# Patient Record
Sex: MALE | Race: WHITE | Employment: OTHER | URBAN - METROPOLITAN AREA
[De-identification: names, ages, dates, MRNs, and addresses within clinical notes are randomized per-mention and may not be internally consistent; named-entity substitution may affect disease eponyms.]

---

## 2017-01-09 PROCEDURE — 88108 CYTOPATH CONCENTRATE TECH: CPT | Performed by: UROLOGY

## 2017-02-22 PROCEDURE — 88305 TISSUE EXAM BY PATHOLOGIST: CPT | Performed by: UROLOGY

## 2017-05-10 PROCEDURE — 88108 CYTOPATH CONCENTRATE TECH: CPT | Performed by: UROLOGY

## 2017-06-09 PROCEDURE — 88108 CYTOPATH CONCENTRATE TECH: CPT | Performed by: UROLOGY

## 2017-07-14 PROCEDURE — 88305 TISSUE EXAM BY PATHOLOGIST: CPT | Performed by: UROLOGY

## 2017-07-14 PROCEDURE — 88108 CYTOPATH CONCENTRATE TECH: CPT | Performed by: UROLOGY

## 2017-09-28 PROBLEM — I77.811 AORTIC ECTASIA, ABDOMINAL (HCC): Status: ACTIVE | Noted: 2017-09-28

## 2019-01-01 ENCOUNTER — ANESTHESIA (OUTPATIENT)
Dept: ENDOSCOPY | Facility: HOSPITAL | Age: 79
End: 2019-01-01
Payer: MEDICARE

## 2019-01-01 ENCOUNTER — ANESTHESIA EVENT (OUTPATIENT)
Dept: ENDOSCOPY | Facility: HOSPITAL | Age: 79
End: 2019-01-01
Payer: MEDICARE

## 2019-01-01 ENCOUNTER — HOSPITAL ENCOUNTER (OUTPATIENT)
Facility: HOSPITAL | Age: 79
Setting detail: HOSPITAL OUTPATIENT SURGERY
Discharge: HOME | End: 2019-01-01
Attending: INTERNAL MEDICINE | Admitting: INTERNAL MEDICINE
Payer: MEDICARE

## 2019-01-01 VITALS
BODY MASS INDEX: 21.26 KG/M2 | DIASTOLIC BLOOD PRESSURE: 69 MMHG | WEIGHT: 157 LBS | SYSTOLIC BLOOD PRESSURE: 117 MMHG | HEART RATE: 73 BPM | HEIGHT: 72 IN | OXYGEN SATURATION: 99 % | RESPIRATION RATE: 17 BRPM

## 2019-01-01 DIAGNOSIS — R13.10 ODYNOPHAGIA: ICD-10-CM

## 2019-01-01 DIAGNOSIS — R07.89 MIDSTERNAL CHEST PAIN: ICD-10-CM

## 2019-01-01 DIAGNOSIS — R63.4 WEIGHT LOSS: ICD-10-CM

## 2019-01-01 DIAGNOSIS — K51.90 ULCERATIVE COLITIS WITHOUT COMPLICATIONS, UNSPECIFIED LOCATION (HCC): ICD-10-CM

## 2019-01-01 PROCEDURE — 0DBH8ZX EXCISION OF CECUM, VIA NATURAL OR ARTIFICIAL OPENING ENDOSCOPIC, DIAGNOSTIC: ICD-10-PCS | Performed by: INTERNAL MEDICINE

## 2019-01-01 PROCEDURE — 0DBN8ZX EXCISION OF SIGMOID COLON, VIA NATURAL OR ARTIFICIAL OPENING ENDOSCOPIC, DIAGNOSTIC: ICD-10-PCS | Performed by: INTERNAL MEDICINE

## 2019-01-01 PROCEDURE — 0DB58ZX EXCISION OF ESOPHAGUS, VIA NATURAL OR ARTIFICIAL OPENING ENDOSCOPIC, DIAGNOSTIC: ICD-10-PCS | Performed by: INTERNAL MEDICINE

## 2019-01-01 PROCEDURE — 0DB68ZX EXCISION OF STOMACH, VIA NATURAL OR ARTIFICIAL OPENING ENDOSCOPIC, DIAGNOSTIC: ICD-10-PCS | Performed by: INTERNAL MEDICINE

## 2019-01-01 PROCEDURE — 0DBL8ZX EXCISION OF TRANSVERSE COLON, VIA NATURAL OR ARTIFICIAL OPENING ENDOSCOPIC, DIAGNOSTIC: ICD-10-PCS | Performed by: INTERNAL MEDICINE

## 2019-01-01 PROCEDURE — 88305 TISSUE EXAM BY PATHOLOGIST: CPT | Performed by: INTERNAL MEDICINE

## 2019-01-01 PROCEDURE — 88312 SPECIAL STAINS GROUP 1: CPT | Performed by: INTERNAL MEDICINE

## 2019-01-01 PROCEDURE — 0DBK8ZX EXCISION OF ASCENDING COLON, VIA NATURAL OR ARTIFICIAL OPENING ENDOSCOPIC, DIAGNOSTIC: ICD-10-PCS | Performed by: INTERNAL MEDICINE

## 2019-01-01 PROCEDURE — 0DB98ZX EXCISION OF DUODENUM, VIA NATURAL OR ARTIFICIAL OPENING ENDOSCOPIC, DIAGNOSTIC: ICD-10-PCS | Performed by: INTERNAL MEDICINE

## 2019-01-01 RX ORDER — SODIUM CHLORIDE, SODIUM LACTATE, POTASSIUM CHLORIDE, CALCIUM CHLORIDE 600; 310; 30; 20 MG/100ML; MG/100ML; MG/100ML; MG/100ML
INJECTION, SOLUTION INTRAVENOUS CONTINUOUS
Status: CANCELLED | OUTPATIENT
Start: 2019-01-01

## 2019-01-01 RX ORDER — MIDAZOLAM HYDROCHLORIDE 1 MG/ML
1 INJECTION INTRAMUSCULAR; INTRAVENOUS EVERY 5 MIN PRN
Status: ACTIVE | OUTPATIENT
Start: 2019-01-01

## 2019-01-01 RX ORDER — NALOXONE HYDROCHLORIDE 0.4 MG/ML
80 INJECTION, SOLUTION INTRAMUSCULAR; INTRAVENOUS; SUBCUTANEOUS AS NEEDED
Status: DISCONTINUED | OUTPATIENT
Start: 2019-01-01 | End: 2019-01-01

## 2019-01-01 RX ORDER — SODIUM CHLORIDE, SODIUM LACTATE, POTASSIUM CHLORIDE, CALCIUM CHLORIDE 600; 310; 30; 20 MG/100ML; MG/100ML; MG/100ML; MG/100ML
INJECTION, SOLUTION INTRAVENOUS CONTINUOUS
Status: DISCONTINUED | OUTPATIENT
Start: 2019-01-01 | End: 2019-01-01

## 2019-01-01 RX ORDER — SODIUM CHLORIDE 0.9 % (FLUSH) 0.9 %
10 SYRINGE (ML) INJECTION AS NEEDED
Status: CANCELLED | OUTPATIENT
Start: 2019-01-01

## 2019-01-01 RX ADMIN — SODIUM CHLORIDE, SODIUM LACTATE, POTASSIUM CHLORIDE, CALCIUM CHLORIDE: 600; 310; 30; 20 INJECTION, SOLUTION INTRAVENOUS at 14:18:00

## 2019-02-18 PROCEDURE — 88108 CYTOPATH CONCENTRATE TECH: CPT | Performed by: UROLOGY

## 2019-09-20 PROBLEM — I48.20 CHRONIC ATRIAL FIBRILLATION (HCC): Status: ACTIVE | Noted: 2019-01-01

## 2019-09-20 PROBLEM — R63.4 WEIGHT LOSS: Status: ACTIVE | Noted: 2019-01-01

## 2019-09-20 PROBLEM — Z51.81 MONITORING FOR LONG-TERM ANTICOAGULANT USE: Status: ACTIVE | Noted: 2019-01-01

## 2019-09-20 PROBLEM — Z79.01 MONITORING FOR LONG-TERM ANTICOAGULANT USE: Status: ACTIVE | Noted: 2019-01-01

## 2019-09-24 PROBLEM — R13.10 ODYNOPHAGIA: Status: ACTIVE | Noted: 2019-01-01

## 2019-09-24 PROBLEM — R07.89 MIDSTERNAL CHEST PAIN: Status: ACTIVE | Noted: 2019-01-01

## 2019-10-01 NOTE — ANESTHESIA PREPROCEDURE EVALUATION
Anesthesia PreOp Note    HPI:     Curtis Piedra is a 70year old male who presents for preoperative consultation requested by: Deonte Kimball MD    Date of Surgery: 10/1/2019    Procedure(s):  COLONOSCOPY  ESOPHAGOGASTRODUODENOSCOPY (EGD)  Indication: Isaias Thomps[place] Skin cancer - BCC   • COPD     moderate, FEV 1 2.5L (66%) - patient denies as of 3/11/14   • Gross hematuria    • High cholesterol    • Hypercholesterolemia     well controlled with med   • HYPERLIPIDEMIA    • Kidney disease     kidney stones   • OTHER DIS SCC   • TONSILLECTOMY           Medications Prior to Admission:  ATORVASTATIN 20 MG Oral Tab TAKE 1  TABLET ONCE DAILY Disp: 90 tablet Rfl: 2 9/24/2019   CARTIA  MG Oral Capsule SR 24 Hr TAKE ONE CAPSULE BY MOUTH ONE TIME DAILY  Disp: 90 capsule Rfl: Attends Buddhism service: Not on file        Active member of club or organization: Not on file        Attends meetings of clubs or organizations: Not on file        Relationship status: Not on file      Intimate partner violence:        Fear of current o of the anesthetic plan, benefits, risks including possible dental damage if relevant, major complications, and any alternative forms of anesthetic management. All of the patient's questions were answered to the best of my ability.  The patient desires the

## 2019-10-01 NOTE — H&P
The H&P dated 9/24/19 was reviewed by Shakira Evans MD today, the patient was examined and no significant changes have occurred in the patient's condition since the H&P was performed.   I discussed with the patient and/or legal representative the potential

## 2019-10-01 NOTE — DISCHARGE SUMMARY
ENDOSCOPY DISCHARGE INSTRUCTIONS    Procedure Performed:   Gastroscopy and Colonoscopy    Endoscopist: No name on file.   FINDINGS:   Normal Esophagus  Nodular appearance of the stomach which was biopsied  Benign outpouching of the small bowel  Numerous col

## 2019-10-01 NOTE — ANESTHESIA POSTPROCEDURE EVALUATION
Patient: Montse Klein    Procedure Summary     Date:  10/01/19 Room / Location:  [facility] ENDOSCOPY 04 / [facility] ENDOSCOPY    Anesthesia Start:  0621 Anesthesia Stop:  6500    Procedures:       COLONOSCOPY (N/A )      ESOPHAGOGASTRODUODENOSCOPY (EGD) (N/A ) Diagn

## 2019-10-01 NOTE — OPERATIVE REPORT
EGD/Colonoscopy Operative Report    Stevie Krystain Patient Status:  Hospital Outpatient Surgery    1940 MRN [id number]   Location [facility] ENDOSCOPY LAB SUITES Attending Sadia Gallagher MD   H gastric antrum. The endoscope was then retroflexed to examine the angulus, GE junction, cardia, body and fundus,  then withdrawn to examine the esophagus. The endoscope was then removed from the patient.  The patient tolerated the procedure well with no im with cold forceps  - 1 sigmoid colon polyp measuring 5 mm s/p removal with cold snare.    - Multiple additional small left sided colon polyps which had pitted pattern on NBI consistent with pseudopolyps which were not removed.   - Sigmoid diverticulosis  -

## 2019-10-03 NOTE — PROGRESS NOTES
Patient with all polyps showing tubular adenomas some of which had appeared hyperplastic during endoscopy. He will need to be brought back in the next 3 months for repeat Colonoscopy with at least 45 minute slot for removal of the rest of the polyps.   He

## 2020-01-01 ENCOUNTER — APPOINTMENT (OUTPATIENT)
Dept: GENERAL RADIOLOGY | Facility: HOSPITAL | Age: 80
DRG: 374 | End: 2020-01-01
Attending: EMERGENCY MEDICINE
Payer: MEDICARE

## 2020-01-01 ENCOUNTER — HOSPITAL ENCOUNTER (INPATIENT)
Facility: HOSPITAL | Age: 80
LOS: 3 days | Discharge: HOME | DRG: 374 | End: 2020-01-01
Attending: EMERGENCY MEDICINE | Admitting: HOSPITALIST
Payer: MEDICARE

## 2020-01-01 ENCOUNTER — APPOINTMENT (OUTPATIENT)
Dept: CT IMAGING | Facility: HOSPITAL | Age: 80
DRG: 871 | End: 2020-01-01
Attending: EMERGENCY MEDICINE
Payer: MEDICARE

## 2020-01-01 ENCOUNTER — APPOINTMENT (OUTPATIENT)
Dept: GENERAL RADIOLOGY | Facility: HOSPITAL | Age: 80
DRG: 871 | End: 2020-01-01
Attending: INTERNAL MEDICINE
Payer: MEDICARE

## 2020-01-01 ENCOUNTER — ANESTHESIA (OUTPATIENT)
Dept: ENDOSCOPY | Facility: HOSPITAL | Age: 80
End: 2020-01-01
Payer: MEDICARE

## 2020-01-01 ENCOUNTER — APPOINTMENT (OUTPATIENT)
Dept: GENERAL RADIOLOGY | Facility: HOSPITAL | Age: 80
DRG: 871 | End: 2020-01-01
Attending: SURGERY
Payer: MEDICARE

## 2020-01-01 ENCOUNTER — ANESTHESIA EVENT (OUTPATIENT)
Dept: ENDOSCOPY | Facility: HOSPITAL | Age: 80
End: 2020-01-01
Payer: MEDICARE

## 2020-01-01 ENCOUNTER — APPOINTMENT (OUTPATIENT)
Dept: GENERAL RADIOLOGY | Facility: HOSPITAL | Age: 80
DRG: 871 | End: 2020-01-01
Attending: EMERGENCY MEDICINE
Payer: MEDICARE

## 2020-01-01 ENCOUNTER — LAB ENCOUNTER (OUTPATIENT)
Dept: LAB | Age: 80
End: 2020-01-01
Payer: MEDICARE

## 2020-01-01 ENCOUNTER — APPOINTMENT (OUTPATIENT)
Dept: NUCLEAR MEDICINE | Facility: HOSPITAL | Age: 80
DRG: 871 | End: 2020-01-01
Attending: EMERGENCY MEDICINE
Payer: MEDICARE

## 2020-01-01 ENCOUNTER — HOSPITAL ENCOUNTER (INPATIENT)
Facility: HOSPITAL | Age: 80
LOS: 1 days | DRG: 871 | End: 2020-01-01
Attending: EMERGENCY MEDICINE | Admitting: HOSPITALIST
Payer: MEDICARE

## 2020-01-01 ENCOUNTER — HOSPITAL ENCOUNTER (OUTPATIENT)
Facility: HOSPITAL | Age: 80
Setting detail: HOSPITAL OUTPATIENT SURGERY
Discharge: HOME | End: 2020-01-01
Attending: INTERNAL MEDICINE | Admitting: INTERNAL MEDICINE
Payer: MEDICARE

## 2020-01-01 ENCOUNTER — APPOINTMENT (OUTPATIENT)
Dept: PICC SERVICES | Facility: HOSPITAL | Age: 80
DRG: 871 | End: 2020-01-01
Attending: INTERNAL MEDICINE
Payer: MEDICARE

## 2020-01-01 ENCOUNTER — HOSPITAL ENCOUNTER (EMERGENCY)
Facility: HOSPITAL | Age: 80
Discharge: HOME | End: 2020-01-01
Attending: EMERGENCY MEDICINE
Payer: MEDICARE

## 2020-01-01 VITALS
RESPIRATION RATE: 17 BRPM | SYSTOLIC BLOOD PRESSURE: 112 MMHG | HEART RATE: 81 BPM | TEMPERATURE: 97 F | OXYGEN SATURATION: 99 % | DIASTOLIC BLOOD PRESSURE: 79 MMHG

## 2020-01-01 VITALS
HEIGHT: 73 IN | HEART RATE: 51 BPM | SYSTOLIC BLOOD PRESSURE: 33 MMHG | WEIGHT: 120 LBS | TEMPERATURE: 96 F | OXYGEN SATURATION: 72 % | BODY MASS INDEX: 15.9 KG/M2 | DIASTOLIC BLOOD PRESSURE: 20 MMHG

## 2020-01-01 VITALS
OXYGEN SATURATION: 93 % | DIASTOLIC BLOOD PRESSURE: 87 MMHG | TEMPERATURE: 97 F | BODY MASS INDEX: 20.32 KG/M2 | HEIGHT: 72 IN | HEART RATE: 73 BPM | RESPIRATION RATE: 14 BRPM | WEIGHT: 150 LBS | SYSTOLIC BLOOD PRESSURE: 141 MMHG

## 2020-01-01 VITALS
HEART RATE: 108 BPM | WEIGHT: 134.38 LBS | TEMPERATURE: 98 F | BODY MASS INDEX: 18 KG/M2 | DIASTOLIC BLOOD PRESSURE: 66 MMHG | RESPIRATION RATE: 19 BRPM | OXYGEN SATURATION: 100 % | SYSTOLIC BLOOD PRESSURE: 106 MMHG

## 2020-01-01 DIAGNOSIS — R53.1 WEAKNESS: ICD-10-CM

## 2020-01-01 DIAGNOSIS — R77.8 ELEVATED TROPONIN: ICD-10-CM

## 2020-01-01 DIAGNOSIS — K52.9 ILEITIS: ICD-10-CM

## 2020-01-01 DIAGNOSIS — I48.91 ATRIAL FIBRILLATION WITH RAPID VENTRICULAR RESPONSE (HCC): Primary | ICD-10-CM

## 2020-01-01 DIAGNOSIS — I48.91 ATRIAL FIBRILLATION WITH RAPID VENTRICULAR RESPONSE (HCC): ICD-10-CM

## 2020-01-01 DIAGNOSIS — Z00.00 ROUTINE GENERAL MEDICAL EXAMINATION AT A HEALTH CARE FACILITY: Primary | ICD-10-CM

## 2020-01-01 DIAGNOSIS — K86.89 DILATED PANCREATIC DUCT: ICD-10-CM

## 2020-01-01 DIAGNOSIS — R79.1 SUPRATHERAPEUTIC INR: ICD-10-CM

## 2020-01-01 DIAGNOSIS — N17.9 AKI (ACUTE KIDNEY INJURY) (HCC): ICD-10-CM

## 2020-01-01 DIAGNOSIS — K92.2 GASTROINTESTINAL HEMORRHAGE, UNSPECIFIED GASTROINTESTINAL HEMORRHAGE TYPE: Primary | ICD-10-CM

## 2020-01-01 DIAGNOSIS — K59.00 CONSTIPATION, UNSPECIFIED CONSTIPATION TYPE: Primary | ICD-10-CM

## 2020-01-01 DIAGNOSIS — C16.9 ADENOCARCINOMA OF STOMACH (HCC): Primary | ICD-10-CM

## 2020-01-01 DIAGNOSIS — R63.4 UNINTENTIONAL WEIGHT LOSS: ICD-10-CM

## 2020-01-01 DIAGNOSIS — K56.600 PARTIAL INTESTINAL OBSTRUCTION, UNSPECIFIED CAUSE (HCC): ICD-10-CM

## 2020-01-01 LAB
ADEQUACY OF SPECIMEN: ADEQUATE
ANION GAP SERPL CALC-SCNC: 12 MMOL/L (ref 0–18)
ANION GAP SERPL CALC-SCNC: 4 MMOL/L (ref 0–18)
ANION GAP SERPL CALC-SCNC: 6 MMOL/L (ref 0–18)
ANION GAP SERPL CALC-SCNC: 7 MMOL/L (ref 0–18)
ANTIBODY SCREEN: NEGATIVE
APTT PPP: 30.9 SECONDS (ref 23.2–35.3)
BASOPHILS # BLD: 0 X10(3) UL (ref 0–0.2)
BASOPHILS NFR BLD: 0 %
BILIRUB UR QL: NEGATIVE
BLOOD TYPE BARCODE: 6200
BLOOD TYPE BARCODE: 8400
BLOOD TYPE BARCODE: 8400
BUN BLD-MCNC: 10 MG/DL (ref 7–18)
BUN BLD-MCNC: 13 MG/DL (ref 7–18)
BUN BLD-MCNC: 23 MG/DL (ref 7–18)
BUN BLD-MCNC: 44 MG/DL (ref 7–18)
BUN/CREAT SERPL: 15.2 (ref 10–20)
BUN/CREAT SERPL: 17.3 (ref 10–20)
BUN/CREAT SERPL: 28.4 (ref 10–20)
BUN/CREAT SERPL: 40.7 (ref 10–20)
C DIFF TOX B STL QL: NEGATIVE
CALCIUM BLD-MCNC: 7.5 MG/DL (ref 8.5–10.1)
CALCIUM BLD-MCNC: 7.6 MG/DL (ref 8.5–10.1)
CALCIUM BLD-MCNC: 7.8 MG/DL (ref 8.5–10.1)
CALCIUM BLD-MCNC: 8 MG/DL (ref 8.5–10.1)
CHLORIDE SERPL-SCNC: 108 MMOL/L (ref 98–112)
CHLORIDE SERPL-SCNC: 110 MMOL/L (ref 98–112)
CHLORIDE SERPL-SCNC: 111 MMOL/L (ref 98–112)
CHLORIDE SERPL-SCNC: 116 MMOL/L (ref 98–112)
CLARITY UR: CLEAR
CO2 SERPL-SCNC: 21 MMOL/L (ref 21–32)
CO2 SERPL-SCNC: 25 MMOL/L (ref 21–32)
CO2 SERPL-SCNC: 26 MMOL/L (ref 21–32)
CO2 SERPL-SCNC: 26 MMOL/L (ref 21–32)
COLOR UR: YELLOW
CREAT BLD-MCNC: 0.66 MG/DL (ref 0.7–1.3)
CREAT BLD-MCNC: 0.75 MG/DL (ref 0.7–1.3)
CREAT BLD-MCNC: 0.81 MG/DL (ref 0.7–1.3)
CREAT BLD-MCNC: 1.08 MG/DL (ref 0.7–1.3)
D DIMER PPP FEU-MCNC: <0.27 UG/ML FEU (ref ?–0.77)
DEPRECATED RDW RBC AUTO: 49.8 FL (ref 35.1–46.3)
DEPRECATED RDW RBC AUTO: 50.6 FL (ref 35.1–46.3)
DEPRECATED RDW RBC AUTO: 50.9 FL (ref 35.1–46.3)
DEPRECATED RDW RBC AUTO: 51.3 FL (ref 35.1–46.3)
DEPRECATED RDW RBC AUTO: 52.4 FL (ref 35.1–46.3)
DEPRECATED RDW RBC AUTO: 52.5 FL (ref 35.1–46.3)
DOHLE BOD BLD QL SMEAR: PRESENT
EOSINOPHIL # BLD: 0 X10(3) UL (ref 0–0.7)
EOSINOPHIL # BLD: 0.34 X10(3) UL (ref 0–0.7)
EOSINOPHIL NFR BLD: 0 %
EOSINOPHIL NFR BLD: 1 %
ERYTHROCYTE [DISTWIDTH] IN BLOOD BY AUTOMATED COUNT: 14.6 % (ref 11–15)
ERYTHROCYTE [DISTWIDTH] IN BLOOD BY AUTOMATED COUNT: 15.3 % (ref 11–15)
ERYTHROCYTE [DISTWIDTH] IN BLOOD BY AUTOMATED COUNT: 15.9 % (ref 11–15)
ERYTHROCYTE [DISTWIDTH] IN BLOOD BY AUTOMATED COUNT: 16.1 % (ref 11–15)
ERYTHROCYTE [DISTWIDTH] IN BLOOD BY AUTOMATED COUNT: 17.3 % (ref 11–15)
ERYTHROCYTE [DISTWIDTH] IN BLOOD BY AUTOMATED COUNT: 17.8 % (ref 11–15)
FIBRINOGEN PPP-MCNC: 174 MG/DL (ref 176–491)
GLUCOSE BLD-MCNC: 148 MG/DL (ref 70–99)
GLUCOSE BLD-MCNC: 75 MG/DL (ref 70–99)
GLUCOSE BLD-MCNC: 81 MG/DL (ref 70–99)
GLUCOSE BLD-MCNC: 90 MG/DL (ref 70–99)
GLUCOSE UR-MCNC: NEGATIVE MG/DL
HAV IGM SER QL: 2 MG/DL (ref 1.6–2.6)
HCT VFR BLD AUTO: 12.9 % (ref 39–53)
HCT VFR BLD AUTO: 18.6 % (ref 39–53)
HCT VFR BLD AUTO: 21.8 % (ref 39–53)
HCT VFR BLD AUTO: 22.4 % (ref 39–53)
HCT VFR BLD AUTO: 22.8 % (ref 39–53)
HCT VFR BLD AUTO: 23.7 % (ref 39–53)
HCT VFR BLD AUTO: 24.5 % (ref 39–53)
HCT VFR BLD AUTO: 24.8 % (ref 39–53)
HGB BLD-MCNC: 4.3 G/DL (ref 13–17.5)
HGB BLD-MCNC: 6.3 G/DL (ref 13–17.5)
HGB BLD-MCNC: 7.3 G/DL (ref 13–17.5)
HGB BLD-MCNC: 7.7 G/DL (ref 13–17.5)
HGB BLD-MCNC: 7.8 G/DL (ref 13–17.5)
HGB BLD-MCNC: 7.9 G/DL (ref 13–17.5)
HGB BLD-MCNC: 8.2 G/DL (ref 13–17.5)
HGB BLD-MCNC: 8.2 G/DL (ref 13–17.5)
HGB UR QL STRIP.AUTO: NEGATIVE
INR BLD: 1.27 (ref 0.9–1.2)
INR BLD: 1.4 (ref 0.9–1.2)
INR BLD: 1.45 (ref 0.9–1.2)
INR BLD: 15.23 (ref 0.9–1.2)
INR BLD: 2.2 (ref 0.9–1.2)
KETONES UR-MCNC: NEGATIVE MG/DL
LEUKOCYTE ESTERASE UR QL STRIP.AUTO: NEGATIVE
LYMPHOCYTES NFR BLD: 0.92 X10(3) UL (ref 1–4)
LYMPHOCYTES NFR BLD: 1.4 X10(3) UL (ref 1–4)
LYMPHOCYTES NFR BLD: 1.63 X10(3) UL (ref 1–4)
LYMPHOCYTES NFR BLD: 1.87 X10(3) UL (ref 1–4)
LYMPHOCYTES NFR BLD: 10 %
LYMPHOCYTES NFR BLD: 2.13 X10(3) UL (ref 1–4)
LYMPHOCYTES NFR BLD: 2.72 X10(3) UL (ref 1–4)
LYMPHOCYTES NFR BLD: 4 %
LYMPHOCYTES NFR BLD: 7 %
LYMPHOCYTES NFR BLD: 8 %
MCH RBC QN AUTO: 30.9 PG (ref 26–34)
MCH RBC QN AUTO: 31.1 PG (ref 26–34)
MCH RBC QN AUTO: 31.2 PG (ref 26–34)
MCH RBC QN AUTO: 31.2 PG (ref 26–34)
MCH RBC QN AUTO: 31.3 PG (ref 26–34)
MCH RBC QN AUTO: 32.3 PG (ref 26–34)
MCHC RBC AUTO-ENTMCNC: 33.1 G/DL (ref 31–37)
MCHC RBC AUTO-ENTMCNC: 33.3 G/DL (ref 31–37)
MCHC RBC AUTO-ENTMCNC: 33.3 G/DL (ref 31–37)
MCHC RBC AUTO-ENTMCNC: 33.5 G/DL (ref 31–37)
MCHC RBC AUTO-ENTMCNC: 33.5 G/DL (ref 31–37)
MCHC RBC AUTO-ENTMCNC: 34.4 G/DL (ref 31–37)
MCV RBC AUTO: 90.7 FL (ref 80–100)
MCV RBC AUTO: 93.2 FL (ref 80–100)
MCV RBC AUTO: 93.3 FL (ref 80–100)
MCV RBC AUTO: 93.6 FL (ref 80–100)
MCV RBC AUTO: 93.6 FL (ref 80–100)
MCV RBC AUTO: 97 FL (ref 80–100)
METAMYELOCYTES # BLD: 0.2 X10(3) UL
METAMYELOCYTES # BLD: 0.21 X10(3) UL
METAMYELOCYTES # BLD: 0.68 X10(3) UL
METAMYELOCYTES # BLD: 0.82 X10(3) UL
METAMYELOCYTES # BLD: 0.92 X10(3) UL
METAMYELOCYTES # BLD: 0.94 X10(3) UL
METAMYELOCYTES NFR BLD: 1 %
METAMYELOCYTES NFR BLD: 1 %
METAMYELOCYTES NFR BLD: 2 %
METAMYELOCYTES NFR BLD: 4 %
MONOCYTES # BLD: 0.69 X10(3) UL (ref 0.1–1)
MONOCYTES # BLD: 0.85 X10(3) UL (ref 0.1–1)
MONOCYTES # BLD: 1.7 X10(3) UL (ref 0.1–1)
MONOCYTES # BLD: 1.8 X10(3) UL (ref 0.1–1)
MONOCYTES # BLD: 2.24 X10(3) UL (ref 0.1–1)
MONOCYTES # BLD: 2.34 X10(3) UL (ref 0.1–1)
MONOCYTES NFR BLD: 10 %
MONOCYTES NFR BLD: 11 %
MONOCYTES NFR BLD: 3 %
MONOCYTES NFR BLD: 4 %
MONOCYTES NFR BLD: 5 %
MONOCYTES NFR BLD: 9 %
MYELOCYTES # BLD: 0.41 X10(3) UL
MYELOCYTES # BLD: 0.6 X10(3) UL
MYELOCYTES # BLD: 0.69 X10(3) UL
MYELOCYTES # BLD: 0.7 X10(3) UL
MYELOCYTES # BLD: 1.02 X10(3) UL
MYELOCYTES # BLD: 1.07 X10(3) UL
MYELOCYTES NFR BLD: 2 %
MYELOCYTES NFR BLD: 3 %
MYELOCYTES NFR BLD: 5 %
NEUTROPHILS # BLD AUTO: 12.66 X10 (3) UL (ref 1.5–7.7)
NEUTROPHILS # BLD AUTO: 12.78 X10 (3) UL (ref 1.5–7.7)
NEUTROPHILS # BLD AUTO: 13.8 X10 (3) UL (ref 1.5–7.7)
NEUTROPHILS # BLD AUTO: 13.84 X10 (3) UL (ref 1.5–7.7)
NEUTROPHILS # BLD AUTO: 14.2 X10 (3) UL (ref 1.5–7.7)
NEUTROPHILS # BLD AUTO: 24.33 X10 (3) UL (ref 1.5–7.7)
NEUTROPHILS NFR BLD: 51 %
NEUTROPHILS NFR BLD: 61 %
NEUTROPHILS NFR BLD: 69 %
NEUTROPHILS NFR BLD: 71 %
NEUTROPHILS NFR BLD: 74 %
NEUTROPHILS NFR BLD: 74 %
NEUTS BAND NFR BLD: 12 %
NEUTS BAND NFR BLD: 18 %
NEUTS BAND NFR BLD: 24 %
NEUTS BAND NFR BLD: 6 %
NEUTS BAND NFR BLD: 7 %
NEUTS BAND NFR BLD: 8 %
NEUTS HYPERSEG # BLD: 15.3 X10(3) UL (ref 1.5–7.7)
NEUTS HYPERSEG # BLD: 15.8 X10(3) UL (ref 1.5–7.7)
NEUTS HYPERSEG # BLD: 16.83 X10(3) UL (ref 1.5–7.7)
NEUTS HYPERSEG # BLD: 17.55 X10(3) UL (ref 1.5–7.7)
NEUTS HYPERSEG # BLD: 19.87 X10(3) UL (ref 1.5–7.7)
NEUTS HYPERSEG # BLD: 27.54 X10(3) UL (ref 1.5–7.7)
NITRITE UR QL STRIP.AUTO: NEGATIVE
NRBC BLD MANUAL-RTO: 1 %
NRBC BLD MANUAL-RTO: 12 %
NRBC BLD MANUAL-RTO: 2 %
NRBC BLD MANUAL-RTO: 3 %
NRBC BLD MANUAL-RTO: 4 %
NRBC BLD MANUAL-RTO: 8 %
OSMOLALITY SERPL CALC.SUM OF ELEC: 288 MOSM/KG (ref 275–295)
OSMOLALITY SERPL CALC.SUM OF ELEC: 295 MOSM/KG (ref 275–295)
OSMOLALITY SERPL CALC.SUM OF ELEC: 305 MOSM/KG (ref 275–295)
OSMOLALITY SERPL CALC.SUM OF ELEC: 310 MOSM/KG (ref 275–295)
PERCENT OF CELLS/CIRCUMFEREN: 0
PH UR: 5 [PH] (ref 5–8)
PLATELET # BLD AUTO: 102 10(3)UL (ref 150–450)
PLATELET # BLD AUTO: 103 10(3)UL (ref 150–450)
PLATELET # BLD AUTO: 120 10(3)UL (ref 150–450)
PLATELET # BLD AUTO: 121 10(3)UL (ref 150–450)
PLATELET # BLD AUTO: 125 10(3)UL (ref 150–450)
PLATELET # BLD AUTO: 130 10(3)UL (ref 150–450)
PLATELET # BLD AUTO: 134 10(3)UL (ref 150–450)
PLATELET MORPHOLOGY: NORMAL
POTASSIUM SERPL-SCNC: 3.1 MMOL/L (ref 3.5–5.1)
POTASSIUM SERPL-SCNC: 3.3 MMOL/L (ref 3.5–5.1)
POTASSIUM SERPL-SCNC: 3.3 MMOL/L (ref 3.5–5.1)
POTASSIUM SERPL-SCNC: 3.4 MMOL/L (ref 3.5–5.1)
POTASSIUM SERPL-SCNC: 3.5 MMOL/L (ref 3.5–5.1)
POTASSIUM SERPL-SCNC: 3.6 MMOL/L (ref 3.5–5.1)
PROCALCITONIN SERPL-MCNC: 0.14 NG/ML (ref ?–0.16)
PROMYELOCYTES # BLD: 0.2 X10(3) UL
PROMYELOCYTES # BLD: 0.21 X10(3) UL
PROMYELOCYTES NFR BLD: 1 %
PROMYELOCYTES NFR BLD: 1 %
PROT UR-MCNC: NEGATIVE MG/DL
PROTHROMBIN TIME: 119 SECONDS (ref 11.8–14.5)
PROTHROMBIN TIME: 15.8 SECONDS (ref 11.8–14.5)
PROTHROMBIN TIME: 17.1 SECONDS (ref 11.8–14.5)
PROTHROMBIN TIME: 17.6 SECONDS (ref 11.8–14.5)
PROTHROMBIN TIME: 24.7 SECONDS (ref 11.8–14.5)
RBC # BLD AUTO: 1.33 X10(6)UL (ref 3.8–5.8)
RBC # BLD AUTO: 2.33 X10(6)UL (ref 3.8–5.8)
RBC # BLD AUTO: 2.47 X10(6)UL (ref 3.8–5.8)
RBC # BLD AUTO: 2.54 X10(6)UL (ref 3.8–5.8)
RBC # BLD AUTO: 2.63 X10(6)UL (ref 3.8–5.8)
RBC # BLD AUTO: 2.65 X10(6)UL (ref 3.8–5.8)
RH BLOOD TYPE: POSITIVE
SODIUM SERPL-SCNC: 140 MMOL/L (ref 136–145)
SODIUM SERPL-SCNC: 143 MMOL/L (ref 136–145)
SODIUM SERPL-SCNC: 143 MMOL/L (ref 136–145)
SODIUM SERPL-SCNC: 146 MMOL/L (ref 136–145)
SP GR UR STRIP: 1.01 (ref 1–1.03)
TOTAL CELLS COUNTED: 100
TOXIC GRANULES BLD QL SMEAR: PRESENT
UROBILINOGEN UR STRIP-ACNC: <2
WBC # BLD AUTO: 20 X10(3) UL (ref 4–11)
WBC # BLD AUTO: 20.4 X10(3) UL (ref 4–11)
WBC # BLD AUTO: 21.3 X10(3) UL (ref 4–11)
WBC # BLD AUTO: 23.1 X10(3) UL (ref 4–11)
WBC # BLD AUTO: 23.4 X10(3) UL (ref 4–11)
WBC # BLD AUTO: 34 X10(3) UL (ref 4–11)

## 2020-01-01 PROCEDURE — 85610 PROTHROMBIN TIME: CPT | Performed by: NURSE PRACTITIONER

## 2020-01-01 PROCEDURE — 88108 CYTOPATH CONCENTRATE TECH: CPT | Performed by: SURGERY

## 2020-01-01 PROCEDURE — 84132 ASSAY OF SERUM POTASSIUM: CPT | Performed by: NURSE PRACTITIONER

## 2020-01-01 PROCEDURE — 93010 ELECTROCARDIOGRAM REPORT: CPT | Performed by: EMERGENCY MEDICINE

## 2020-01-01 PROCEDURE — C9113 INJ PANTOPRAZOLE SODIUM, VIA: HCPCS | Performed by: NURSE PRACTITIONER

## 2020-01-01 PROCEDURE — 85027 COMPLETE CBC AUTOMATED: CPT | Performed by: EMERGENCY MEDICINE

## 2020-01-01 PROCEDURE — 71045 X-RAY EXAM CHEST 1 VIEW: CPT | Performed by: EMERGENCY MEDICINE

## 2020-01-01 PROCEDURE — 80048 BASIC METABOLIC PNL TOTAL CA: CPT | Performed by: NURSE PRACTITIONER

## 2020-01-01 PROCEDURE — 97165 OT EVAL LOW COMPLEX 30 MIN: CPT

## 2020-01-01 PROCEDURE — 71045 X-RAY EXAM CHEST 1 VIEW: CPT | Performed by: SURGERY

## 2020-01-01 PROCEDURE — 85025 COMPLETE CBC W/AUTO DIFF WBC: CPT | Performed by: NURSE PRACTITIONER

## 2020-01-01 PROCEDURE — 81003 URINALYSIS AUTO W/O SCOPE: CPT | Performed by: EMERGENCY MEDICINE

## 2020-01-01 PROCEDURE — 88312 SPECIAL STAINS GROUP 1: CPT | Performed by: INTERNAL MEDICINE

## 2020-01-01 PROCEDURE — 86140 C-REACTIVE PROTEIN: CPT | Performed by: EMERGENCY MEDICINE

## 2020-01-01 PROCEDURE — 85007 BL SMEAR W/DIFF WBC COUNT: CPT | Performed by: EMERGENCY MEDICINE

## 2020-01-01 PROCEDURE — 30233K1 TRANSFUSION OF NONAUTOLOGOUS FROZEN PLASMA INTO PERIPHERAL VEIN, PERCUTANEOUS APPROACH: ICD-10-PCS | Performed by: EMERGENCY MEDICINE

## 2020-01-01 PROCEDURE — 36415 COLL VENOUS BLD VENIPUNCTURE: CPT

## 2020-01-01 PROCEDURE — 97116 GAIT TRAINING THERAPY: CPT

## 2020-01-01 PROCEDURE — 97530 THERAPEUTIC ACTIVITIES: CPT

## 2020-01-01 PROCEDURE — 80076 HEPATIC FUNCTION PANEL: CPT | Performed by: EMERGENCY MEDICINE

## 2020-01-01 PROCEDURE — 85014 HEMATOCRIT: CPT | Performed by: NURSE PRACTITIONER

## 2020-01-01 PROCEDURE — 85060 BLOOD SMEAR INTERPRETATION: CPT | Performed by: EMERGENCY MEDICINE

## 2020-01-01 PROCEDURE — BD42ZZZ ULTRASONOGRAPHY OF STOMACH: ICD-10-PCS | Performed by: INTERNAL MEDICINE

## 2020-01-01 PROCEDURE — 80048 BASIC METABOLIC PNL TOTAL CA: CPT | Performed by: EMERGENCY MEDICINE

## 2020-01-01 PROCEDURE — 83605 ASSAY OF LACTIC ACID: CPT | Performed by: INTERNAL MEDICINE

## 2020-01-01 PROCEDURE — 88360 TUMOR IMMUNOHISTOCHEM/MANUAL: CPT

## 2020-01-01 PROCEDURE — 96366 THER/PROPH/DIAG IV INF ADDON: CPT

## 2020-01-01 PROCEDURE — 99285 EMERGENCY DEPT VISIT HI MDM: CPT

## 2020-01-01 PROCEDURE — 74176 CT ABD & PELVIS W/O CONTRAST: CPT | Performed by: EMERGENCY MEDICINE

## 2020-01-01 PROCEDURE — 36430 TRANSFUSION BLD/BLD COMPNT: CPT

## 2020-01-01 PROCEDURE — 84443 ASSAY THYROID STIM HORMONE: CPT | Performed by: EMERGENCY MEDICINE

## 2020-01-01 PROCEDURE — 0DB68ZX EXCISION OF STOMACH, VIA NATURAL OR ARTIFICIAL OPENING ENDOSCOPIC, DIAGNOSTIC: ICD-10-PCS | Performed by: INTERNAL MEDICINE

## 2020-01-01 PROCEDURE — 0DB98ZX EXCISION OF DUODENUM, VIA NATURAL OR ARTIFICIAL OPENING ENDOSCOPIC, DIAGNOSTIC: ICD-10-PCS | Performed by: INTERNAL MEDICINE

## 2020-01-01 PROCEDURE — 84132 ASSAY OF SERUM POTASSIUM: CPT | Performed by: HOSPITALIST

## 2020-01-01 PROCEDURE — 85384 FIBRINOGEN ACTIVITY: CPT | Performed by: HOSPITALIST

## 2020-01-01 PROCEDURE — 83735 ASSAY OF MAGNESIUM: CPT | Performed by: HOSPITALIST

## 2020-01-01 PROCEDURE — 96361 HYDRATE IV INFUSION ADD-ON: CPT

## 2020-01-01 PROCEDURE — 36600 WITHDRAWAL OF ARTERIAL BLOOD: CPT | Performed by: HOSPITALIST

## 2020-01-01 PROCEDURE — 97161 PT EVAL LOW COMPLEX 20 MIN: CPT

## 2020-01-01 PROCEDURE — 36410 VNPNXR 3YR/> PHY/QHP DX/THER: CPT

## 2020-01-01 PROCEDURE — 85027 COMPLETE CBC AUTOMATED: CPT | Performed by: NURSE PRACTITIONER

## 2020-01-01 PROCEDURE — 30233N1 TRANSFUSION OF NONAUTOLOGOUS RED BLOOD CELLS INTO PERIPHERAL VEIN, PERCUTANEOUS APPROACH: ICD-10-PCS | Performed by: EMERGENCY MEDICINE

## 2020-01-01 PROCEDURE — 80061 LIPID PANEL: CPT | Performed by: EMERGENCY MEDICINE

## 2020-01-01 PROCEDURE — 86900 BLOOD TYPING SEROLOGIC ABO: CPT | Performed by: EMERGENCY MEDICINE

## 2020-01-01 PROCEDURE — 97535 SELF CARE MNGMENT TRAINING: CPT

## 2020-01-01 PROCEDURE — 85610 PROTHROMBIN TIME: CPT | Performed by: HOSPITALIST

## 2020-01-01 PROCEDURE — 88305 TISSUE EXAM BY PATHOLOGIST: CPT | Performed by: INTERNAL MEDICINE

## 2020-01-01 PROCEDURE — 85007 BL SMEAR W/DIFF WBC COUNT: CPT | Performed by: HOSPITALIST

## 2020-01-01 PROCEDURE — 86920 COMPATIBILITY TEST SPIN: CPT

## 2020-01-01 PROCEDURE — C9113 INJ PANTOPRAZOLE SODIUM, VIA: HCPCS | Performed by: EMERGENCY MEDICINE

## 2020-01-01 PROCEDURE — 81301 MICROSATELLITE INSTABILITY: CPT

## 2020-01-01 PROCEDURE — 85007 BL SMEAR W/DIFF WBC COUNT: CPT | Performed by: NURSE PRACTITIONER

## 2020-01-01 PROCEDURE — 85027 COMPLETE CBC AUTOMATED: CPT | Performed by: INTERNAL MEDICINE

## 2020-01-01 PROCEDURE — 85379 FIBRIN DEGRADATION QUANT: CPT | Performed by: EMERGENCY MEDICINE

## 2020-01-01 PROCEDURE — 85018 HEMOGLOBIN: CPT | Performed by: NURSE PRACTITIONER

## 2020-01-01 PROCEDURE — 99291 CRITICAL CARE FIRST HOUR: CPT

## 2020-01-01 PROCEDURE — 85060 BLOOD SMEAR INTERPRETATION: CPT | Performed by: NURSE PRACTITIONER

## 2020-01-01 PROCEDURE — 88341 IMHCHEM/IMCYTCHM EA ADD ANTB: CPT | Performed by: INTERNAL MEDICINE

## 2020-01-01 PROCEDURE — 85730 THROMBOPLASTIN TIME PARTIAL: CPT | Performed by: HOSPITALIST

## 2020-01-01 PROCEDURE — 85007 BL SMEAR W/DIFF WBC COUNT: CPT | Performed by: INTERNAL MEDICINE

## 2020-01-01 PROCEDURE — 94002 VENT MGMT INPAT INIT DAY: CPT

## 2020-01-01 PROCEDURE — 85610 PROTHROMBIN TIME: CPT | Performed by: EMERGENCY MEDICINE

## 2020-01-01 PROCEDURE — 87040 BLOOD CULTURE FOR BACTERIA: CPT | Performed by: INTERNAL MEDICINE

## 2020-01-01 PROCEDURE — C9113 INJ PANTOPRAZOLE SODIUM, VIA: HCPCS | Performed by: SURGERY

## 2020-01-01 PROCEDURE — 87493 C DIFF AMPLIFIED PROBE: CPT | Performed by: INTERNAL MEDICINE

## 2020-01-01 PROCEDURE — 93005 ELECTROCARDIOGRAM TRACING: CPT

## 2020-01-01 PROCEDURE — 96375 TX/PRO/DX INJ NEW DRUG ADDON: CPT

## 2020-01-01 PROCEDURE — 84484 ASSAY OF TROPONIN QUANT: CPT | Performed by: EMERGENCY MEDICINE

## 2020-01-01 PROCEDURE — 86901 BLOOD TYPING SEROLOGIC RH(D): CPT | Performed by: EMERGENCY MEDICINE

## 2020-01-01 PROCEDURE — 96376 TX/PRO/DX INJ SAME DRUG ADON: CPT

## 2020-01-01 PROCEDURE — 99283 EMERGENCY DEPT VISIT LOW MDM: CPT

## 2020-01-01 PROCEDURE — 96368 THER/DIAG CONCURRENT INF: CPT

## 2020-01-01 PROCEDURE — 83735 ASSAY OF MAGNESIUM: CPT | Performed by: EMERGENCY MEDICINE

## 2020-01-01 PROCEDURE — 88305 TISSUE EXAM BY PATHOLOGIST: CPT | Performed by: SURGERY

## 2020-01-01 PROCEDURE — 88341 IMHCHEM/IMCYTCHM EA ADD ANTB: CPT | Performed by: SURGERY

## 2020-01-01 PROCEDURE — 96365 THER/PROPH/DIAG IV INF INIT: CPT

## 2020-01-01 PROCEDURE — 88342 IMHCHEM/IMCYTCHM 1ST ANTB: CPT | Performed by: SURGERY

## 2020-01-01 PROCEDURE — 85379 FIBRIN DEGRADATION QUANT: CPT | Performed by: HOSPITALIST

## 2020-01-01 PROCEDURE — 85025 COMPLETE CBC W/AUTO DIFF WBC: CPT | Performed by: HOSPITALIST

## 2020-01-01 PROCEDURE — 88381 MICRODISSECTION MANUAL: CPT

## 2020-01-01 PROCEDURE — C9113 INJ PANTOPRAZOLE SODIUM, VIA: HCPCS | Performed by: HOSPITALIST

## 2020-01-01 PROCEDURE — 82272 OCCULT BLD FECES 1-3 TESTS: CPT

## 2020-01-01 PROCEDURE — 80048 BASIC METABOLIC PNL TOTAL CA: CPT | Performed by: INTERNAL MEDICINE

## 2020-01-01 PROCEDURE — P9045 ALBUMIN (HUMAN), 5%, 250 ML: HCPCS | Performed by: EMERGENCY MEDICINE

## 2020-01-01 PROCEDURE — 82805 BLOOD GASES W/O2 SATURATION: CPT | Performed by: HOSPITALIST

## 2020-01-01 PROCEDURE — 82728 ASSAY OF FERRITIN: CPT | Performed by: EMERGENCY MEDICINE

## 2020-01-01 PROCEDURE — 78580 LUNG PERFUSION IMAGING: CPT | Performed by: EMERGENCY MEDICINE

## 2020-01-01 PROCEDURE — 99292 CRITICAL CARE ADDL 30 MIN: CPT

## 2020-01-01 PROCEDURE — 71045 X-RAY EXAM CHEST 1 VIEW: CPT | Performed by: INTERNAL MEDICINE

## 2020-01-01 PROCEDURE — 87040 BLOOD CULTURE FOR BACTERIA: CPT | Performed by: NURSE PRACTITIONER

## 2020-01-01 PROCEDURE — 85027 COMPLETE CBC AUTOMATED: CPT | Performed by: HOSPITALIST

## 2020-01-01 PROCEDURE — 97110 THERAPEUTIC EXERCISES: CPT

## 2020-01-01 PROCEDURE — 86850 RBC ANTIBODY SCREEN: CPT | Performed by: EMERGENCY MEDICINE

## 2020-01-01 PROCEDURE — 88342 IMHCHEM/IMCYTCHM 1ST ANTB: CPT | Performed by: INTERNAL MEDICINE

## 2020-01-01 PROCEDURE — 84145 PROCALCITONIN (PCT): CPT | Performed by: NURSE PRACTITIONER

## 2020-01-01 PROCEDURE — 81001 URINALYSIS AUTO W/SCOPE: CPT | Performed by: HOSPITALIST

## 2020-01-01 PROCEDURE — 85025 COMPLETE CBC W/AUTO DIFF WBC: CPT | Performed by: INTERNAL MEDICINE

## 2020-01-01 PROCEDURE — 85025 COMPLETE CBC W/AUTO DIFF WBC: CPT | Performed by: EMERGENCY MEDICINE

## 2020-01-01 PROCEDURE — 85049 AUTOMATED PLATELET COUNT: CPT | Performed by: HOSPITALIST

## 2020-01-01 RX ORDER — SODIUM CHLORIDE 0.9 % (FLUSH) 0.9 %
3 SYRINGE (ML) INJECTION AS NEEDED
Status: DISCONTINUED | OUTPATIENT
Start: 2020-01-01 | End: 2020-05-16

## 2020-01-01 RX ORDER — POTASSIUM CHLORIDE 20 MEQ/1
40 TABLET, EXTENDED RELEASE ORAL EVERY 4 HOURS
Status: COMPLETED | OUTPATIENT
Start: 2020-01-01 | End: 2020-01-01

## 2020-01-01 RX ORDER — DOCUSATE SODIUM 100 MG/1
100 CAPSULE, LIQUID FILLED ORAL
COMMUNITY

## 2020-01-01 RX ORDER — DIGOXIN 0.25 MG/ML
INJECTION INTRAMUSCULAR; INTRAVENOUS
Status: COMPLETED
Start: 2020-01-01 | End: 2020-01-01

## 2020-01-01 RX ORDER — METOCLOPRAMIDE HYDROCHLORIDE 5 MG/ML
5 INJECTION INTRAMUSCULAR; INTRAVENOUS EVERY 8 HOURS PRN
Status: DISCONTINUED | OUTPATIENT
Start: 2020-01-01 | End: 2020-05-16

## 2020-01-01 RX ORDER — DILTIAZEM HYDROCHLORIDE 240 MG/1
240 CAPSULE, COATED, EXTENDED RELEASE ORAL DAILY
Status: DISCONTINUED | OUTPATIENT
Start: 2020-01-01 | End: 2020-01-01

## 2020-01-01 RX ORDER — ATORVASTATIN CALCIUM 20 MG/1
20 TABLET, FILM COATED ORAL DAILY
Status: DISCONTINUED | OUTPATIENT
Start: 2020-01-01 | End: 2020-01-01

## 2020-01-01 RX ORDER — LIDOCAINE HYDROCHLORIDE 10 MG/ML
INJECTION, SOLUTION EPIDURAL; INFILTRATION; INTRACAUDAL; PERINEURAL AS NEEDED
Status: DISCONTINUED | OUTPATIENT
Start: 2020-01-01 | End: 2020-01-01 | Stop reason: SURG

## 2020-01-01 RX ORDER — HEPARIN SODIUM 5000 [USP'U]/ML
5000 INJECTION, SOLUTION INTRAVENOUS; SUBCUTANEOUS EVERY 12 HOURS SCHEDULED
Status: DISCONTINUED | OUTPATIENT
Start: 2020-01-01 | End: 2020-01-01

## 2020-01-01 RX ORDER — WARFARIN SODIUM 4 MG/1
4 TABLET ORAL SEE ADMIN INSTRUCTIONS
Status: ON HOLD | COMMUNITY
End: 2020-01-01

## 2020-01-01 RX ORDER — DILTIAZEM HYDROCHLORIDE 60 MG/1
60 TABLET, FILM COATED ORAL AS NEEDED
Status: DISCONTINUED | OUTPATIENT
Start: 2020-01-01 | End: 2020-05-16

## 2020-01-01 RX ORDER — ACETAMINOPHEN 160 MG/5ML
650 SOLUTION ORAL EVERY 4 HOURS PRN
Status: CANCELLED | OUTPATIENT
Start: 2020-01-01

## 2020-01-01 RX ORDER — MORPHINE SULFATE 4 MG/ML
2 INJECTION, SOLUTION INTRAMUSCULAR; INTRAVENOUS ONCE
Status: COMPLETED | OUTPATIENT
Start: 2020-01-01 | End: 2020-01-01

## 2020-01-01 RX ORDER — SODIUM CHLORIDE 9 MG/ML
INJECTION, SOLUTION INTRAVENOUS CONTINUOUS
Status: DISCONTINUED | OUTPATIENT
Start: 2020-01-01 | End: 2020-05-16

## 2020-01-01 RX ORDER — HYDROMORPHONE HYDROCHLORIDE 1 MG/ML
0.4 INJECTION, SOLUTION INTRAMUSCULAR; INTRAVENOUS; SUBCUTANEOUS
Status: CANCELLED | OUTPATIENT
Start: 2020-01-01 | End: 2020-01-01

## 2020-01-01 RX ORDER — MORPHINE SULFATE 2 MG/ML
2 INJECTION, SOLUTION INTRAMUSCULAR; INTRAVENOUS EVERY 2 HOUR PRN
Status: DISCONTINUED | OUTPATIENT
Start: 2020-01-01 | End: 2020-05-16

## 2020-01-01 RX ORDER — DILTIAZEM HYDROCHLORIDE 5 MG/ML
10 INJECTION INTRAVENOUS ONCE
Status: DISCONTINUED | OUTPATIENT
Start: 2020-01-01 | End: 2020-01-01

## 2020-01-01 RX ORDER — ONDANSETRON 4 MG/1
4 TABLET, ORALLY DISINTEGRATING ORAL EVERY 6 HOURS PRN
Status: CANCELLED | OUTPATIENT
Start: 2020-01-01

## 2020-01-01 RX ORDER — SODIUM CHLORIDE, SODIUM LACTATE, POTASSIUM CHLORIDE, CALCIUM CHLORIDE 600; 310; 30; 20 MG/100ML; MG/100ML; MG/100ML; MG/100ML
INJECTION, SOLUTION INTRAVENOUS CONTINUOUS
Status: DISCONTINUED | OUTPATIENT
Start: 2020-01-01 | End: 2020-01-01

## 2020-01-01 RX ORDER — AMOXICILLIN AND CLAVULANATE POTASSIUM 875; 125 MG/1; MG/1
1 TABLET, FILM COATED ORAL 2 TIMES DAILY
Qty: 8 TABLET | Refills: 0 | Status: SHIPPED | OUTPATIENT
Start: 2020-01-01 | End: 2020-01-01

## 2020-01-01 RX ORDER — ASPIRIN 81 MG/1
324 TABLET, CHEWABLE ORAL ONCE
Status: COMPLETED | OUTPATIENT
Start: 2020-01-01 | End: 2020-01-01

## 2020-01-01 RX ORDER — ONDANSETRON 2 MG/ML
4 INJECTION INTRAMUSCULAR; INTRAVENOUS EVERY 6 HOURS PRN
Status: CANCELLED | OUTPATIENT
Start: 2020-01-01

## 2020-01-01 RX ORDER — EPHEDRINE SULFATE 50 MG/ML
INJECTION, SOLUTION INTRAVENOUS AS NEEDED
Status: DISCONTINUED | OUTPATIENT
Start: 2020-01-01 | End: 2020-01-01 | Stop reason: SURG

## 2020-01-01 RX ORDER — WARFARIN SODIUM 2 MG/1
2 TABLET ORAL NIGHTLY
Status: DISCONTINUED | OUTPATIENT
Start: 2020-01-01 | End: 2020-01-01

## 2020-01-01 RX ORDER — ACETAMINOPHEN 325 MG/1
650 TABLET ORAL EVERY 6 HOURS PRN
Status: DISCONTINUED | OUTPATIENT
Start: 2020-01-01 | End: 2020-05-16

## 2020-01-01 RX ORDER — SODIUM CHLORIDE 9 MG/ML
INJECTION, SOLUTION INTRAVENOUS CONTINUOUS
Status: DISCONTINUED | OUTPATIENT
Start: 2020-01-01 | End: 2020-01-01

## 2020-01-01 RX ORDER — SODIUM CHLORIDE 0.9 % (FLUSH) 0.9 %
3 SYRINGE (ML) INJECTION AS NEEDED
Status: DISCONTINUED | OUTPATIENT
Start: 2020-01-01 | End: 2020-01-01

## 2020-01-01 RX ORDER — MORPHINE SULFATE 2 MG/ML
1 INJECTION, SOLUTION INTRAMUSCULAR; INTRAVENOUS EVERY 2 HOUR PRN
Status: DISCONTINUED | OUTPATIENT
Start: 2020-01-01 | End: 2020-05-16

## 2020-01-01 RX ORDER — DIGOXIN 0.25 MG/ML
250 INJECTION INTRAMUSCULAR; INTRAVENOUS ONCE
Status: COMPLETED | OUTPATIENT
Start: 2020-01-01 | End: 2020-01-01

## 2020-01-01 RX ORDER — DILTIAZEM HYDROCHLORIDE 120 MG/1
240 CAPSULE, EXTENDED RELEASE ORAL DAILY
Status: DISCONTINUED | OUTPATIENT
Start: 2020-01-01 | End: 2020-01-01

## 2020-01-01 RX ORDER — DILTIAZEM HYDROCHLORIDE 5 MG/ML
15 INJECTION INTRAVENOUS ONCE
Status: COMPLETED | OUTPATIENT
Start: 2020-01-01 | End: 2020-01-01

## 2020-01-01 RX ORDER — SODIUM CHLORIDE 0.9 % (FLUSH) 0.9 %
10 SYRINGE (ML) INJECTION AS NEEDED
Status: CANCELLED | OUTPATIENT
Start: 2020-01-01

## 2020-01-01 RX ORDER — DILTIAZEM HYDROCHLORIDE 5 MG/ML
10 INJECTION INTRAVENOUS ONCE
Status: COMPLETED | OUTPATIENT
Start: 2020-01-01 | End: 2020-01-01

## 2020-01-01 RX ORDER — ACETAMINOPHEN 325 MG/1
650 TABLET ORAL EVERY 4 HOURS PRN
Status: CANCELLED | OUTPATIENT
Start: 2020-01-01

## 2020-01-01 RX ORDER — METOCLOPRAMIDE HYDROCHLORIDE 5 MG/ML
10 INJECTION INTRAMUSCULAR; INTRAVENOUS EVERY 8 HOURS PRN
Status: DISCONTINUED | OUTPATIENT
Start: 2020-01-01 | End: 2020-01-01

## 2020-01-01 RX ORDER — ONDANSETRON 2 MG/ML
4 INJECTION INTRAMUSCULAR; INTRAVENOUS EVERY 6 HOURS PRN
Status: DISCONTINUED | OUTPATIENT
Start: 2020-01-01 | End: 2020-05-16

## 2020-01-01 RX ORDER — MORPHINE SULFATE 2 MG/ML
0.5 INJECTION, SOLUTION INTRAMUSCULAR; INTRAVENOUS EVERY 2 HOUR PRN
Status: DISCONTINUED | OUTPATIENT
Start: 2020-01-01 | End: 2020-05-16

## 2020-01-01 RX ORDER — ALBUMIN, HUMAN INJ 5% 5 %
500 SOLUTION INTRAVENOUS ONCE
Status: COMPLETED | OUTPATIENT
Start: 2020-01-01 | End: 2020-01-01

## 2020-01-01 RX ORDER — POTASSIUM CHLORIDE 20 MEQ/1
40 TABLET, EXTENDED RELEASE ORAL ONCE
Status: COMPLETED | OUTPATIENT
Start: 2020-01-01 | End: 2020-01-01

## 2020-01-01 RX ORDER — DILTIAZEM HYDROCHLORIDE 60 MG/1
60 TABLET, FILM COATED ORAL EVERY 6 HOURS SCHEDULED
Status: DISCONTINUED | OUTPATIENT
Start: 2020-01-01 | End: 2020-01-01

## 2020-01-01 RX ORDER — ACETAMINOPHEN 325 MG/1
650 TABLET ORAL EVERY 6 HOURS PRN
Status: DISCONTINUED | OUTPATIENT
Start: 2020-01-01 | End: 2020-01-01

## 2020-01-01 RX ADMIN — EPHEDRINE SULFATE 10 MG: 50 INJECTION, SOLUTION INTRAVENOUS at 14:09:00

## 2020-01-01 RX ADMIN — LIDOCAINE HYDROCHLORIDE 50 MG: 10 INJECTION, SOLUTION EPIDURAL; INFILTRATION; INTRACAUDAL; PERINEURAL at 13:42:00

## 2020-01-01 RX ADMIN — SODIUM CHLORIDE, SODIUM LACTATE, POTASSIUM CHLORIDE, CALCIUM CHLORIDE: 600; 310; 30; 20 INJECTION, SOLUTION INTRAVENOUS at 14:15:00

## 2020-01-29 NOTE — H&P
[address] [facility] Department of  Gastroenterology  Update Health History :       Gisell Godoy  male   Andrea Wilkerson MD     [id number]  5/14/1940 Primary Care Physician  Han Osborn MD        HPI :  Weight loss. Dilated pancreatic duct.   Nodular a OTHER SURGICAL HISTORY      pneumothorox x 3   • OTHER SURGICAL HISTORY  2/24/14    cystoscopy- Dr. Uma Mitchell   • OTHER SURGICAL HISTORY  05/20/15    Cystoscopy - Dr. Uma Mitchell    • OTHER SURGICAL HISTORY  08/19/15    Cystoscopy Dr. Uma Mitchell    • [address] [place] [address] (4mg) po 2 days per week or as directed., Disp: 120 tablet, Rfl: 3        Review of Symptoms:  A comprehensive 10 point review of systems was completed.     /69 (BP Location: Left arm)   Pulse 93   Temp 96.8 °F (36 °C)   Resp 19   Ht 6' (1.829 m)   [employer]

## 2020-01-29 NOTE — ANESTHESIA PREPROCEDURE EVALUATION
Anesthesia PreOp Note    HPI:     Francisco Tovar is a 70year old male who presents for preoperative consultation requested by: Rodrigo Kaur MD    Date of Surgery: 1/29/2020    Procedure(s):  ENDOSCOPIC ULTRASOUND (EUS)  ESOPHAGOGASTRODUODENOSCOPY (EGD pressure    • High cholesterol    • Hypercholesterolemia     well controlled with med   • HYPERLIPIDEMIA    • Kidney disease     kidney stones   • OTHER DISEASES     ulc colitis   • OTHER DISEASES     rosacea   • OTHER DISEASES     carotid art ds   • Predi 09/17/2019    BTS Cystoscopy- Dr. Veena Wade   • SKIN SURGERY  6/6/16    MMS to Right Scalp/ SCC   • TONSILLECTOMY         ATORVASTATIN 20 MG Oral Tab, TAKE 1  TABLET ONCE DAILY, Disp: 90 tablet, Rfl: 2, Taking  CARTIA  MG Oral Capsule SR 24 Hr, TAKE ON per session: Not on file      Stress: Not on file    Relationships      Social connections:        Talks on phone: Not on file        Gets together: Not on file        Attends Bahai service: Not on file        Active member of club or organization: Not Discussed With:  Patient and spouse  Discussed plan with:  Surgeon      I have informed Havery Laughter and/or legal guardian or family member of the nature of the anesthetic plan, benefits, risks including possible dental damage if relevant, major complica

## 2020-01-29 NOTE — ANESTHESIA POSTPROCEDURE EVALUATION
Patient: Nasrin Negrete    Procedure Summary     Date:  01/29/20 Room / Location:  [facility] ENDOSCOPY 01 / [facility] ENDOSCOPY    Anesthesia Start:  6199 Anesthesia Stop:  9208    Procedures:       ENDOSCOPIC ULTRASOUND (EUS) (N/A )      ESOPHAGOGASTRODUODENOSCOPY (E

## 2020-01-29 NOTE — OPERATIVE REPORT
OPERATIVE REPORT   PATIENT NAME: Taniya Avila  MRN: [id number]  DATE OF OPERATION: 1/29/2020  PREOPERATIVE DIAGNOSIS:   1. Weight loss. 2. Dilated pancreatic duct. 3. Gastritis and prior upper endoscopy showing nodular appearing gastric mucosa.   POSTOPE noted.  Small sliding hiatal hernia Hill grade 2 approximately centimeter and a half in length. 2.  The gastric mucosa had nodular appearance to it and the majority of the body. Were able to distended adequately with air insufflation.   The stomach overal Coumadin tomorrow. 2. Omeprazole 20 mg once daily. 3. Check final biopsy results. If negative then he will require repeat endoscopy and biopsies.   4. We will consider repeat imaging study (CT of the abdomen with the lumen to distend the stomach and poss

## 2020-02-10 PROBLEM — C16.3 MALIGNANT NEOPLASM OF PYLORIC ANTRUM (HCC): Status: ACTIVE | Noted: 2020-01-01

## 2020-02-13 PROBLEM — C16.9 GASTRIC ADENOCARCINOMA (HCC): Status: ACTIVE | Noted: 2020-01-01

## 2020-02-13 PROBLEM — R53.1 WEAKNESS: Status: ACTIVE | Noted: 2020-01-01

## 2020-03-23 PROBLEM — K92.2 GASTROINTESTINAL HEMORRHAGE, UNSPECIFIED GASTROINTESTINAL HEMORRHAGE TYPE: Status: ACTIVE | Noted: 2020-01-01

## 2020-03-23 PROBLEM — R79.1 SUPRATHERAPEUTIC INR: Status: ACTIVE | Noted: 2020-01-01

## 2020-03-23 PROBLEM — I48.91 ATRIAL FIBRILLATION WITH RAPID VENTRICULAR RESPONSE (HCC): Status: ACTIVE | Noted: 2020-01-01

## 2020-03-23 PROBLEM — K92.2 GASTROINTESTINAL HEMORRHAGE: Status: ACTIVE | Noted: 2020-01-01

## 2020-03-23 NOTE — CONSULTS
Cardiology Consultation  [address] [facility] Dharmeshmarcklucero Patient Status:  Inpatient    1940 MRN [id number]   Location [facility] 2W/SW Attending Dong Turner MD   Hosp Day # 0 PCP Fara Alvares MD     Reason for Consultation:  Afib Performed by Tacos Ray MD at [address] N/A 7/14/2017    Performed by Cedrick Dupree MD at [address] OF BLADDER TUMOR W/ MITOMYCIN N/A 3/14/2014    Performed by Raúl Chin • SKIN SURGERY  6/6/16    MMS to Right Scalp/ SCC   • TONSILLECTOMY       Family History   Problem Relation Age of Onset   • Heart Disorder Father    • Other ([other]) Mother         demetia   • Heart Disorder Mother       reports that he quit smoking ab days per week or as directed. (Patient taking differently: Take 2 mg by mouth See Admin Instructions.  Every SuMoWeThSa ), Disp: 120 tablet, Rfl: 3        Review of Systems:  Constitutional: + generalized weakness  HEENT: denies headache, vision changes, tr ventricular systolic pressure is     within the normal range. 6. Tricuspid valve: There is mild regurgitation. 7. Pericardium, extracardiac: There is no significant pericardial effusion. Stress Test 2/2020:  - Normal nuclear perfusion study.   - There 02/15/2018    LDL 68 02/17/2017     Lab Results   Component Value Date    AST 32 03/16/2020    AST 34 03/02/2020    AST 23 02/05/2020     Lab Results   Component Value Date    ALT 49 03/16/2020    ALT 36 03/02/2020    ALT 25 02/05/2020       Assessment/Perry

## 2020-03-23 NOTE — CM/SW NOTE
JOSE received- d/c needs. SW called pt's wife Eva Velasquez. Wife shared that she and pt live in a split-level home with 7 stairs between floors. Pt is independent with ambulation and ADLs. He does not use any DME for ambulation. They have a shower chair.   P

## 2020-03-23 NOTE — CONSULTS
[facility] HOSP - [facility]    Report of Consultation    Hernando Juans Patient Status:  Emergency    1940 MRN [id number]   Location [address] Attending Bridgett Wright MD   Hosp Day # 0 PCP Nasir Hamilton MD     Date of Adm High cholesterol    • Hypercholesterolemia     well controlled with med   • HYPERLIPIDEMIA    • Kidney disease     kidney stones   • OTHER DISEASES     ulc colitis   • OTHER DISEASES     rosacea   • OTHER DISEASES     carotid art ds   • Prediabetes    • Sp HISTORY  02/22/2017    Cystoscopy-Dr Nettles   • OTHER SURGICAL HISTORY  5/10/17    Cystoscopy - Dr Yen Reich   • OTHER SURGICAL HISTORY  07/14/2017    cysto, bbx, bladder wash Dr Yen Reich    • OTHER SURGICAL HISTORY  10/17/2017    BTS Cystoscopy- Dr. Yen Reich no suspicious lesions  HEENT: atraumatic, normocephalic, ears and throat are clear  CHEST/CARDIO:  RRR without murmur  LUNGS: clear to auscultation  GI: good BS's and no masses, HSM or tenderness  RECTAL: Exam not done.   MUSCULOSKELETAL: back is not tender gastric cancer. His Colonoscopy had several polyps with multiple small ones which were not removed last year however unlikely to be bleeding from here.     Plan:  - Vitamin K 10 mg IV   - FFP x 2  - Transfuse blood x 2  - Serial CBCs  - Protonix gtt  - Madhu Khanna

## 2020-03-23 NOTE — PROGRESS NOTES
[facility] Dosing Service    Initial Pharmacokinetic Consult for Vancomycin Dosing     Helen Monge is a 70year old male who is being treated for sepsis. Pharmacy has been asked to dose Vancomycin by Dr. Niecy Arzate    He has No Known Allergies.     Vital

## 2020-03-23 NOTE — ED PROVIDER NOTES
Patient Seen in: [facility] Emergency Department      History   Patient presents with:  Fatigue    Stated Complaint: weak    HPI    History is provided by EMS and patient.     27-year-old male with history of atrial fibrillation, on Coumadin, hyper [address]   • CYSTOSCOPY WITH BIOPSY AND FULGURATION N/A 6/10/2016    Performed by Marcus Rojo MD at [address] [facility]  (EUS) N/A 1/29/2020    Performed by Evans Horner MD at [address] [facility] ENDOSCOPY   • [address] [place] [address] Years since quittin.2      Smokeless tobacco: Never Used      Tobacco comment: 2008 quit    Alcohol use: Not Currently      Frequency: 4 or more times a week      Comment: 4-5 drinks a day on average    Drug use:  No             Review of Systems   Con and DP pulses b/l, no leg swelling  Pulmonary:      Effort: Pulmonary effort is normal. No respiratory distress. Breath sounds: Normal breath sounds. No stridor. No wheezing or rales. Abdominal:      General: There is no distension.       Palpations: mmol/L    Potassium 3.6 3.5 - 5.1 mmol/L    Chloride 110 98 - 112 mmol/L    CO2 21.0 21.0 - 32.0 mmol/L    Anion Gap 12 0 - 18 mmol/L    BUN 44 (H) 7 - 18 mg/dL    Creatinine 1.08 0.70 - 1.30 mg/dL    BUN/CREA Ratio 40.7 (H) 10.0 - 20.0    Calcium, Total 8 Time: 03/23/20  8:39 AM   Result Value Ref Range    ABO BLOOD TYPE AB     RH BLOOD TYPE Positive    ANTIBODY SCREEN    Collection Time: 03/23/20  8:39 AM   Result Value Ref Range    Antibody Screen Negative    PREPARE RBC    Collection Time: 03/23/20  9:32 procedures, and reviewed those reports. Complicating Factors: The patient already has does not have any pertinent problems on file. to contribute to the complexity of his ED evaluation.     EMERGENCY DEPARTMENT MEDICAL DECISION MAKING:  After obtaining t

## 2020-03-23 NOTE — PROGRESS NOTES
Jessika Borja made visit at patient request. Patient alert in bed  O- patient spoke about his sadness and frustration because of health decline. Patient spoke about his family and plans they had this summer that will not happen now.    Patient spoke about f

## 2020-03-23 NOTE — PHYSICAL THERAPY NOTE
PT evaluation orders received. patient's Hgb <5; INR >15;  Will re-attempt 3/24  Thank you,  Taniya Olivas, PT, DPT

## 2020-03-23 NOTE — H&P
[facility]  Hospitalist Team  History and Physical  Admit Date:  3/23/20    ASSESSMENT / PLAN:   69 yo male with hx afib on warfarin, bladder CA, HTN, HL, HTN and gastric adenocarcinoma with peritoneal mets who presents with weakness and black tarry stools, pt wit Patient presents with:  Fatigue       PCP: Augustine Ascencio MD    History of Present Illness:     Patient completed his second round of chemo last week. He notes for the last 2 weeks  feeling weak which worsened on Saturday.   He notes decreased appetite and [address] [place] [address]   • CYSTOSCOPY TRANSURETHRAL RESECTION OF BLADDER TUMOR W/ MITOMYCIN N/A 3/14/2014    Performed by Dereje Rodas MD at [address] [place] [address]   • CYSTOSCOPY WITH BIOPSY AND FULGURATION N/A 6/10/2016    Performed by Fe Ojeda Medications:  Warfarin Sodium 4 MG Oral Tab, Take 4 mg by mouth See Admin Instructions. Every TuFri, Disp: , Rfl:   docusate sodium 100 MG Oral Cap, Take 100 mg by mouth daily as needed for constipation. , Disp: , Rfl:   Prochlorperazine Maleate (COMPAZINE) Mother        Review of Systems  A comprehensive 10 point review of systems was completed. Pertinent positives and negatives noted in the the HPI.       DIAGNOSTIC DATA:   CBC/Chem  Recent Labs   Lab 03/23/20  0745   WBC 34.0*   HGB 4.3*   MCV 97.0   PLT 1

## 2020-03-23 NOTE — ED INITIAL ASSESSMENT (HPI)
Pt arrived via medics to rm 42 for complaint of feeling weak and black tarry stools, pt currently being treated for stomach cancer and is taking coumadin

## 2020-03-24 NOTE — PHYSICAL THERAPY NOTE
PHYSICAL THERAPY EVALUATION - INPATIENT     Room Number: 490/643-E  Evaluation Date: 3/24/2020  Type of Evaluation: Initial   Physician Order: PT Eval and Treat    Presenting Problem: GI Hemorrhage  Reason for Therapy: Mobility Dysfunction and Discharge P Good  Frequency (Obs): 3-5x/week       PHYSICAL THERAPY MEDICAL/SOCIAL HISTORY     History related to current admission: Patient with hx of UC, HLD, CAD, HTN, COPD, and Afib      Problem List  Principal Problem:    Gastrointestinal hemorrhage, unspecified (EGD) N/A 1/29/2020    Performed by Maximo Arroyo MD at [address] [place] [address] ENDOSCOPY   • ESOPHAGOGASTRODUODENOSCOPY (EGD) N/A 10/1/2019    Performed by Patty Sifuentes MD at [place] [address] N/A 2/21/2020    Performed by Tigre Hopper MD a OBJECTIVE  Precautions: (chemo)  Fall Risk: Standard fall risk    WEIGHT BEARING RESTRICTION  Weight Bearing Restriction: None                PAIN ASSESSMENT  Ratin          COGNITION  · Overall Cognitive Status:  WFL - within functional limits training    Patient End of Session: Up in chair;Needs met;Call light within reach;RN aware of session/findings; All patient questions and concerns addressed    CURRENT GOALS    Goals to be met by: 3/30/20  Patient Goal Patient's self-stated goal is: to go h

## 2020-03-24 NOTE — PLAN OF CARE
Double RN skin check done prior to transfer off Unit. Skin check performed by this RN and pat    Wounds are as follows: bruising  To arms and legs scattered     Will remain available for any further questions or concerns.

## 2020-03-24 NOTE — PLAN OF CARE
Problem: Patient Centered Care  Goal: Patient preferences are identified and integrated in the patient's plan of care  Description  Interventions:  - What would you like us to know as we care for you?   - Provide timely, complete, and accurate informatio period  Description  INTERVENTIONS  - Monitor WBC  - Administer growth factors as ordered  - Implement neutropenic guidelines  Outcome: Progressing     Problem: SAFETY ADULT - FALL  Goal: Free from fall injury  Description  INTERVENTIONS:  - Assess pt freq of vasoactive medications to optimize hemodynamic stability  - Monitor arterial and/or venous puncture sites for bleeding and/or hematoma  - Assess quality of pulses, skin color and temperature  - Assess for signs of decreased coronary artery perfusion - e collaborating with pharmacy to review patient's medication profile  Outcome: Progressing     Problem: METABOLIC/FLUID AND ELECTROLYTES - ADULT  Goal: Electrolytes maintained within normal limits  Description  INTERVENTIONS:  - Monitor labs and rhythm and a

## 2020-03-24 NOTE — PROGRESS NOTES
[facility]    Progress Note    Cass Urena Patient Status:  Inpatient    1940 MRN [id number]   Location [facility] 2W/SW Attending Deepthi Landeros MD   Hosp Day # 1 PCP Sameer Linton MD        Subjective:   Patient feeling be injury/ischemia.  ABNORMAL No previous ECGs available Electronically signed on 03/23/2020 at 11:30 by Rogelio Gonzales DO       Assessment and Plan:   70year old male with a history of Afib on Coumadin, recently diagnosed Gastric Cancer with chemotherapy,

## 2020-03-24 NOTE — PAYOR COMM NOTE
--------------  Appropriate for inpatient status per guidelines for symptomatic anemia and supratherapeutic INR in a patient with hx of gastric cancer receiving chemotherapy.           ADMISSION REVIEW     Payor: [employer]  Subscriber #:  [id number] • COLONOSCOPY     • COLONOSCOPY N/A 10/1/2019    Performed by Rosales Singer MD at [facility] N/A 7/14/2017    Performed by Janessa Yu MD at [facility]   • CYSTOSCOPY TRANSURETHRAL RESECTION OF BLADDER T HISTORY  09/17/2019    BTS Cystoscopy- Dr. Yen Reich   • SKIN SURGERY  6/6/16    MMS to Right Scalp/ SCC   • TONSILLECTOMY           Review of Systems   Constitutional: Positive for fatigue. Negative for appetite change and fever.    HENT: Negative for conges distress. Breath sounds: Normal breath sounds. No stridor. No wheezing or rales. Abdominal:      General: There is no distension. Palpations: Abdomen is soft. Tenderness: There is no tenderness. There is no guarding.    Genitourinary:     C - 18 mmol/L    BUN 44 (H) 7 - 18 mg/dL    Creatinine 1.08 0.70 - 1.30 mg/dL    BUN/CREA Ratio 40.7 (H) 10.0 - 20.0    Calcium, Total 8.0 (L) 8.5 - 10.1 mg/dL    Calculated Osmolality 310 (H) 275 - 295 mOsm/kg    GFR, Non- 66 >=60    GFR, Af Collection Time: 03/23/20  8:39 AM   Result Value Ref Range    Antibody Screen Negative    PREPARE RBC    Collection Time: 03/23/20  9:32 AM   Result Value Ref Range    Blood Product [id number]     Unit Number [id number]     UNIT ABO AB     UNIT RH POS GI    Leukoctyosis   -? Recent neulasta  -tmax 99.3 WBC 34.  PCT negative  -UA negative  -CXR negative for acute process  -blood cx  -hold off on abx for now unless fever or work up positive as discussed with Dr Nain Olivarez    Hypoprothrombinemia  -INR 15  - non labored, CTA   CARDIOVASCULAR: regular,nl S1 S2  ABDOMEN:  Soft, BS+; non distended, non tender    EXTREMITIES: no LE edema  PMH  Past Medical History:   Diagnosis Date   • Aortic ectasia, abdominal (HCC) 9/28/2017   • Arrhythmia     A-fib   • ATRIAL F Performed by Marcus Rojo MD at [address] (GENERAL) N/A 2/21/2020    Performed by Stephanie Romano MD at [address]    surgery for the lung to adhere to the wall for collapsed bowel sounds present  MSK: Full range of motion in extremities, no clubbing, no cyanosis  Skin: no rashes or lesions  Neuro: AO*3, motor intact, no sensory deficits  Psyc: appropriate mood and affect    A/p:   Anemia d/t acute blood loss w hx of gastric ca follow                  3/24 HOSP    A/p:   Anemia: d/t melena, hgb 7.7 s/p 3u prbc  -dic neg     afib w rvr: dilt at 15, dig x 1, add oral dilt and try and wean drip     Sepsis: source unclear, fever in between tranfusions 3/23, now afebrile, wbc improvin cancer on chemotherapy with GI bleed, supratherapeutic INR and anemia     Anemia  - hgb of 4.3 on admission secondary to supratherapeutic INR  - hgb of 7.7 today after 3 transfusions     Supratherapeutic INR   - INR of 15 on admission--> now down to 1.45 a

## 2020-03-24 NOTE — CONSULTS
Hematology/Oncology Consult Note        NAME: Kely Loja - ROOM: 220/332-X - MRN: [id number] - Age: 70year old - : 1940    Reason for Consult:  GI bleed, supratherapeutic INR     Patient is a 70 y.o male who is currently admitted with GI bleed OF BLADDER TUMOR W/ MITOMYCIN N/A 3/14/2014    Performed by Rios Arita MD at [facility] AND FULGURATION N/A 6/10/2016    Performed by Rios Arita MD at [address] [facility] ( • Heart Disorder Mother        SOCIAL HISTORY: Social History    Tobacco Use      Smoking status: Former Smoker        Packs/day: 1.00        Years: 40.00        Pack years: 40        Types: Cigarettes        Quit date: 1/1/1994        Years since [employer] 3/24/2020 3/23/2020 3/23/2020           5:56 AM  1:33 AM  6:37 PM  1:45 PM   Hemoglobin      13.0 - 17.5 g/dL 7.7 (L) 7.8 (L) 6.3 (LL)      Component      Latest Ref Rng & Units 3/23/2020 3/16/2020 3/2/2020 2/5/2020           7:45 AM      Hemoglobin      1 Component      Latest Ref Rng & Units 10/20/2016 9/15/2016 8/4/2016 7/7/2016                INR      0.90 - 1.20 2.6 (A) 2.3 (A) 2.5 (A) 2.4 (A)     Component      Latest Ref Rng & Units 6/21/2016 6/14/2016 5/3/2016 3/22/2016                INR      0. my recommendations and had their questions answered to their satisfaction. Thank you for allowing me to participate in the care of this patient, please call with any questions.     Yoel Kenyon M.D.  [facility] Hematology and Oncology

## 2020-03-24 NOTE — PLAN OF CARE
Pt A/O x 3. Pt on a-fib received from the ED on a Cardizem drip. Protonix drip added for GI bleed. 2 units of PRBCs given. Pt developed a temp of 101.1 after 1 unit of blood. Dr. Niecy Arzate notified, Tylenol given. Second unit of blood given without fevers.  Re management  - Manage/alleviate anxiety  - Utilize distraction and/or relaxation techniques  - Monitor for opioid side effects  - Notify MD/LIP if interventions unsuccessful or patient reports new pain  - Anticipate increased pain with activity and pre-medi physician/LIP order or complex needs related to functional status, cognitive ability or social support system  Outcome: Progressing     Problem: CARDIOVASCULAR - ADULT  Goal: Maintains optimal cardiac output and hemodynamic stability  Description  INTERVEN Evaluate fluid balance  Outcome: Progressing  Goal: Maintains or returns to baseline bowel function  Description  INTERVENTIONS:  - Assess bowel function  - Maintain adequate hydration with IV or PO as ordered and tolerated  - Evaluate effectiveness of GI

## 2020-03-24 NOTE — PROGRESS NOTES
[facility] Hospitalist Team  Progress Note    Marcin Gamble Patient Status:  Inpatient    1940 MRN [id number]   Location [facility] 2W/SW Attending Ancelmo Willingham MD   Hosp Day # 1 PCP Ivis Guerin MD     CC: Follow Up  PCP: Ivis Guerin MD    SEE need close outpt follow up, per oncology can test every 2 weeks, pt not interested in switching to NOAC  -as per cards      Gastric Adenocarinoma with Peritoneal Mets  -1/31  recent EGD reviewed: pathology consistent with [facility] adenocarcinoma, poorly d 4.3*  --   --  6.3* 7.8* 7.7*   MCV 97.0  --   --   --   --  90.7   .0*  --  121.0*  --   --  102.0*   BAND 7  --   --   --   --  18   INR 15.23* 2.20*  --   --   --  1.45*       Recent Labs   Lab 03/23/20  0745 03/24/20  0556    146*   K 3.6 Finalized by (CST):  Selwyn Dozier MD on 3/23/2020 at 8:34 AM              SEE ATTENDING NOTE BELOW:   Agree w above    S: no cp    O:  Exam  Gen: No acute distress, alert and oriented x3, no focal neurologic deficits  Heent: NC AT, mucous memb clear, P

## 2020-03-24 NOTE — PROGRESS NOTES
Report given to oncoming RN. Patient vital signs as charted. Patient turned every two hours or more frequently as needed. One unit of blood given. Patient did not have any stools under my care and he is denying any pain or discomfort at this time.

## 2020-03-24 NOTE — OCCUPATIONAL THERAPY NOTE
OCCUPATIONAL THERAPY EVALUATION - INPATIENT     Room Number: 029/417-T  Evaluation Date: 3/24/2020  Type of Evaluation: Initial       Physician Order: IP Consult to Occupational Therapy  Reason for Therapy: ADL/IADL Dysfunction and Discharge Planning    OC Gastrointestinal hemorrhage, unspecified gastrointestinal hemorrhage type  Active Problems:    Weakness    Gastrointestinal hemorrhage    Atrial fibrillation with rapid ventricular response (HCC)    Supratherapeutic INR      Past Medical History  Past Medi 2/21/2020    Performed by Kendy Bal MD at [address] [place] [address] N/A 6/10/2016    Performed by Brittany Scott MD at [address] [place] [address] (GENERAL) N/A 2/21/2020    Performed b Bathing (including washing, rinsing, drying)?: A Little  -   Toileting, which includes using toilet, bedpan or urinal? : A Little  -   Putting on and taking off regular upper body clothing?: A Little  -   Taking care of personal grooming such as brushing t

## 2020-03-25 PROBLEM — K92.2 GASTROINTESTINAL HEMORRHAGE, UNSPECIFIED GASTROINTESTINAL HEMORRHAGE TYPE: Status: RESOLVED | Noted: 2020-01-01 | Resolved: 2020-01-01

## 2020-03-25 PROBLEM — R53.1 WEAKNESS: Status: RESOLVED | Noted: 2020-01-01 | Resolved: 2020-01-01

## 2020-03-25 NOTE — DISCHARGE SUMMARY
[facility] Hospitalist Discharge Summary   Patient ID:  Liliam Perez  [id number]  4 year old  5/14/1940    Admit date: 3/23/2020  Discharge date: 3/26/2020    Primary Care Physician: Vicky Pizano MD   Attending Physician: Anita Mohamud MD   Consults:   Consult omeprazole  -consideration for EGD if recurrent bleeding   -follow up with GI as needed     Leukocytosis-improved   Fever -resolved   -recent neulasta  -tmax 101 WBC 34-->21.3-->20-->20.4-->23.1 PCT negative  -UA negative  -CXR negative for acute process taking these medications    apixaban 5 MG Tabs  Commonly known as:  ELIQUIS  Take 1 tablet (5 mg total) by mouth 2 (two) times daily.  Please let pt know co pay cost as well        CHANGE how you take these medications    atorvastatin 20 MG Tabs  Commonly k [address] [facility]             Brandon Giraldo MD In 1 month.     Specialty:  Internal Medicine  Contact information:  [address] [address], [address] [facility] [phone]                 Disposition: home  Discharged Condition: stable    Additional

## 2020-03-25 NOTE — PROGRESS NOTES
[facility]    Cardiology Progress Note    Varun Ortiz Patient Status:  Inpatient    1940 MRN [id number]   Location [facility] 3W/SW Attending Ulices Floyd MD   Hosp Day # 2 PCP Toribio Steen MD       Impression/Plan:  70 y Intake 1472.5 ml   Output 1825 ml   Net -352.5 ml       Last 3 Weights  03/25/20 0523 : 138 lb 2 oz (62.7 kg)  03/23/20 1300 : 137 lb 9.6 oz (62.4 kg)  03/16/20 1000 : 138 lb (62.6 kg)  03/02/20 1029 : 142 lb 11.2 oz (64.7 kg)      Tele: Afib with RVR times per day  Potassium Chloride ER (K-DUR M20) CR tab 40 mEq, 40 mEq, Oral, Q4H  Warfarin Sodium (COUMADIN) tab 2 mg, 2 mg, Oral, Nightly  Pantoprazole Sodium (PROTONIX) 80 mg in sodium chloride 0.9% 100 mL infusion, 8 mg/hr, Intravenous, Once  Normal Sa

## 2020-03-25 NOTE — PLAN OF CARE
Patient a/o x3-4. Can be forgetful at times. Tolerating full liquid diet without complication. Protonix gtt. Cardizem gtt. Iv zosyn. Iv vanco. Ambulates with one assist and a walker. Plan to discharge home with home PT when medically cleared.  Will continue if interventions unsuccessful or patient reports new pain  - Anticipate increased pain with activity and pre-medicate as appropriate  Outcome: Progressing     Problem: RISK FOR INFECTION - ADULT  Goal: Absence of fever/infection during anticipated neutrope CARDIOVASCULAR - ADULT  Goal: Maintains optimal cardiac output and hemodynamic stability  Description  INTERVENTIONS:  - Monitor vital signs, rhythm, and trends  - Monitor for bleeding, hypotension and signs of decreased cardiac output  - Evaluate effectiv function  - Maintain adequate hydration with IV or PO as ordered and tolerated  - Evaluate effectiveness of GI medications  - Encourage mobilization and activity  - Obtain nutritional consult as needed  - Establish a toileting routine/schedule  - Consider

## 2020-03-25 NOTE — PROGRESS NOTES
Hematology/Oncology follow up Note     Reports some loose stools. Currently ruling out c.diff  No blood in stool. No fevers/chills. Hem/Onc History:   - presents in initial consultation for weight loss.   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   - 3 Nate Becerra MD at [address] [place] [address] ENDOSCOPY   • INSERTION PORT-A-CATHETER N/A 2/21/2020    Performed by Misha Jones MD at [address] [place] [address] N/A 6/10/2016    Performed by Danita Yun MD at [facility], day   • Potassium Chloride ER  40 mEq Oral Q4H   • Warfarin Sodium  2 mg Oral Nightly   • pantoprazole (PROTONIX) 100mL infusion  8 mg/hr Intravenous Once   • piperacillin-tazobactam  3.375 g Intravenous Q8H     • pantoprazole (PROTONIX) 100mL infusion 8 m 17.5 g/dL 14.2 13.4 15.2 14.2     Component      Latest Ref Rng & Units 5/31/2016 8/13/2015 6/9/2015               Hemoglobin      13.0 - 17.5 g/dL 15.0 13.6 14.4     Component      Latest Ref Rng & Units 3/24/2020 3/23/2020 3/23/2020 3/6/2020           12 (A) 2.3 (A) 2.5 (A)     Component      Latest Ref Rng & Units 9/11/2015 8/13/2015 7/16/2015 6/30/2015                INR      0.90 - 1.20 3.4 (A) 3.3 (A) 2.0 (A) 1.8 (H)     Component      Latest Ref Rng & Units 6/9/2015 4/14/2015              INR      0.9

## 2020-03-25 NOTE — PHYSICAL THERAPY NOTE
PHYSICAL THERAPY HIP TREATMENT NOTE - INPATIENT    Room Number: 820/501-P            Presenting Problem: GI Hemorrhage    Problem List  Principal Problem:    Gastrointestinal hemorrhage, unspecified gastrointestinal hemorrhage type  Active Problems:    Wea BEARING RESTRICTION  Weight Bearing Restriction: None                PAIN ASSESSMENT   Ratin          BALANCE  Static Sitting: Good  Dynamic Sitting: Fair +  Static Standing: Fair  Dynamic Standing: Fair -    AM-PAC '6-Clicks' INPATIENT SHORT FORM - BA RW   Goal #3 Patient is able to ambulate 200 feet with assist device: none at assistance level: modified independent   Goal #3   Current Status SBA with RW 50' in room per RN recommendations contact isolation   Goal #4 Patient will negotiate 7 stairs/one c

## 2020-03-25 NOTE — OCCUPATIONAL THERAPY NOTE
OCCUPATIONAL THERAPY TREATMENT NOTE - INPATIENT        Room Number: 521/585-K                Problem List  Principal Problem:    Gastrointestinal hemorrhage, unspecified gastrointestinal hemorrhage type  Active Problems:    Weakness    Gastrointestinal hem education    SUBJECTIVE  Agreeable to activity      OBJECTIVE  Precautions: (chemo)    WEIGHT BEARING RESTRICTION  Weight Bearing Restriction: None                PAIN ASSESSMENT  Ratin           ACTIVITY TOLERANCE  See above     ACTIVITIES OF DAILY LI

## 2020-03-25 NOTE — PROGRESS NOTES
[facility] Hospitalist Team  Progress Note    Brian Martines Patient Status:  Inpatient    1940 MRN [id number]   Location [facility] 2W/SW Attending Buddy Montano MD   Hosp Day # 2 PCP Maryuri Escobar MD     CC: Follow Up  PCP: Maryuri Escobar MD    SEE Adenocarinoma with Peritoneal Mets  -1/31  recent EGD reviewed: pathology consistent with [facility] adenocarcinoma, poorly differentiated, with signet ring cell features.  -2/6/2020 CT chest/abd/pelvis with no evidence of metastatic disease  -S/P Port  -S/ 03/24/20 2151 03/25/20  0521    146*  --  143   K 3.6 3.3* 3.3* 3.1*    116*  --  111   CO2 21.0 26.0  --  25.0   BUN 44* 23*  --  13   CREATSERUM 1.08 0.81  --  0.75   CA 8.0* 7.5*  --  7.6*   MG  --  2.0  --   --    * 90  --  81 normal respiratory effort  CV: Heart with regular rate and rhythm, no peripheral edema  Abd: Abdomen soft, nontender, nondistended, no organomegaly, bowel sounds present  MSK: Full range of motion in extremities, no clubbing, no cyanosis  Skin: no rashes o

## 2020-03-25 NOTE — DIETARY NOTE
ADULT NUTRITION INITIAL ASSESSMENT    Pt is at high nutrition risk. Pt meets severe malnutrition criteria.       CRITERIA FOR MALNUTRITION DIAGNOSIS:  Criteria for severe malnutrition diagnosis: chronic illness related to wt loss greater than 5% in 1 month Nutrition education: education completed, reinforced importance of increased Kcal/Pro intake with supplement use    - Coordination of nutrition care: collaboration with other providers    - Discharge and transfer of nutrition care to new setting or provide : 68.5 kg (151 lb)  02/12/20 : 67.9 kg (149 lb 12.8 oz)  02/07/20 : 69.5 kg (153 lb 4.8 oz)  01/29/20 : 68 kg (150 lb)  01/07/20 : 68.9 kg (152 lb)      GASTROINTESTINAL: hx of stomach cancer with Chemo    FOOD/NUTRITION RELATED HISTORY:  Appetite: Decreas needs and prevent skin breakdown      DIETITIAN FOLLOW UP: RD to follow and monitor Nutrition status     Eunice Reina, DINO,LDN  [phone]

## 2020-03-25 NOTE — CM/SW NOTE
SW self-referred to patient chart,per PT/OT recommendations, patient may benefit from [facility] services. SW attempted to connect with pt to discuss services. Unable to reach.  TRISH spoke to RN/Cesilia who confirmed with patient - he does not want [facility] at this t

## 2020-03-25 NOTE — PROGRESS NOTES
[facility]    Progress Note    Hernando Spence Patient Status:  Inpatient    1940 MRN [id number]   Location [facility] 2W/SW Attending Bridgett Wright MD   Hosp Day # 2 PCP Nasir Hamilton MD        Subjective:   Patient had a ru causing oozing from gastric cancer. His Colonoscopy had several polyps with multiple small ones which were not removed last year however unlikely to be bleeding from here.   - Patient has improved since receiving blood, Vitamin K and FFP and Coumadin has b

## 2020-03-26 NOTE — PROGRESS NOTES
Hematology/Oncology follow up Note   No further loose stools yesterday/overnight  Switched to eliquis yesterday  hgb stable  C.diff is negative     Hem/Onc History:   - presents in initial consultation for weight loss.   - 1/2020 EGD with gastric adenocarci Performed by Vlad Pineda MD at [address] [facility] ENDOSCOPY   • INSERTION PORT-A-CATHETER N/A 2/21/2020    Performed by Kendy Bal MD at [address] [place] [address] N/A 6/10/2016    Performed by Brittany Scott MD at [facility] Alexis Hazel dilTIAZem HCl ER Coated Beads  240 mg Oral Daily   • apixaban  5 mg Oral BID   • pantoprazole (PROTONIX) IV push  40 mg Intravenous Q12H   • piperacillin-tazobactam  3.375 g Intravenous Q8H       ALLERGIES: No Known Allergies    Review of Systems   12 poin 13.0 - 17.5 g/dL 14.2 13.4 15.2 14.2     Component      Latest Ref Rng & Units 5/31/2016 8/13/2015 6/9/2015               Hemoglobin      13.0 - 17.5 g/dL 15.0 13.6 14.4     Component      Latest Ref Rng & Units 3/24/2020 3/23/2020 3/23/2020 3/6/2020 (A) 1.9 (A) 2.3 (A) 2.5 (A)     Component      Latest Ref Rng & Units 9/11/2015 8/13/2015 7/16/2015 6/30/2015                INR      0.90 - 1.20 3.4 (A) 3.3 (A) 2.0 (A) 1.8 (H)     Component      Latest Ref Rng & Units 6/9/2015 4/14/2015              INR

## 2020-03-26 NOTE — DISCHARGE PLANNING
Patient was provided with discharge instructions, education, and follow up information. Prescriptions were already sent electronically to patient's pharmacy.  Patient verbalizes understanding of follow up information, specifically medications prescribed wit

## 2020-03-26 NOTE — PLAN OF CARE
Cardizem drip and protonix drip discontinued. UA and stool sample sent. Pt still having frequent dark loose stools. Plan is to start eliquis tonight and home with PT when able.      Problem: Patient Centered Care  Goal: Patient preferences are identified an activity and pre-medicate as appropriate  Outcome: Progressing     Problem: RISK FOR INFECTION - ADULT  Goal: Absence of fever/infection during anticipated neutropenic period  Description  INTERVENTIONS  - Monitor WBC  - Administer growth factors as ordere stability  Description  INTERVENTIONS:  - Monitor vital signs, rhythm, and trends  - Monitor for bleeding, hypotension and signs of decreased cardiac output  - Evaluate effectiveness of vasoactive medications to optimize hemodynamic stability  - Monitor ar Evaluate effectiveness of GI medications  - Encourage mobilization and activity  - Obtain nutritional consult as needed  - Establish a toileting routine/schedule  - Consider collaborating with pharmacy to review patient's medication profile  Outcome: Progr

## 2020-03-26 NOTE — PLAN OF CARE
Problem: Patient Centered Care  Goal: Patient preferences are identified and integrated in the patient's plan of care  Description  Interventions:  - What would you like us to know as we care for you?   - Provide timely, complete, and accurate informatio Notify MD/LIP if interventions unsuccessful or patient reports new pain  - Anticipate increased pain with activity and pre-medicate as appropriate  3/26/2020 0704 by Audie Shirley RN  Outcome: Progressing  3/26/2020 0649 by Audie Shirley, [address] [place] [address] be responsible for managing their own health  - Refer to Case Management Department for coordinating discharge planning if the patient needs post-hospital services based on physician/LIP order or complex needs related to functional status, cognitive abilit Selvin Ennis, RN  Outcome: Progressing     Problem: GASTROINTESTINAL - ADULT  Goal: Minimal or absence of nausea and vomiting  Description  INTERVENTIONS:  - Maintain adequate hydration with IV or PO as ordered and tolerated  - Nasogastric tube to low intermit ulcer development  - Assess and document skin integrity  - Monitor for areas of redness and/or skin breakdown  - Initiate interventions, skin care algorithm/standards of care as needed  3/26/2020 0704 by Roshan Mcneill RN  Outcome: Progressing  3/26/202

## 2020-03-27 NOTE — PAYOR COMM NOTE
--------------  DISCHARGE REVIEW    Payor: [facility] [place] #:  [id number]  Authorization Number:  [id number]    Admit date: 3/23/20  Admit time:  1255  Discharge Date: 3/26/2020 11:00 AM     Admitting Physician: MD Brenna Desai HTN, HL, HTN and gastric adenocarcinoma with peritoneal mets who presents with weakness and black tarry stools, pt with hgb 4.3 and INR 15. Bleeding attributed to gastric cancer, pt switched from warfarin to eliquis, if recurrent bleeding will need to stop    HL  -statin      EXAM:   GENERAL: no apparent distress  NEURO: A/A Ox3  RESP: non labored, CTA  CARDIO: Regular, no murmur  ABD: soft, NT, ND, BS+  EXTREMITIES: no edema    Operative Procedures:   Radiology:   Xr Chest Ap Portable  (cpt=71045)    Resu doctor about these medications    omeprazole 20 MG Cpdr  Commonly known as:  PRILOSEC  Take 1 capsule (20 mg total) by mouth daily.  Before meal           Where to Get Your Medications      These medications were sent to Kaila#[id number] at [facility]

## 2020-04-13 NOTE — ED NOTES
Pt reports return of large amount of stool. Pt reports he feels much better and states \"mission accomplished. \"

## 2020-04-13 NOTE — ED NOTES
Soaps suds enema administered per protocol and written order. Return of two, hard formed pieces of stool. Pt reports he feels like there is more stool that needs to come. Second attempt at soaps suds enema administered at this time.

## 2020-04-13 NOTE — ED INITIAL ASSESSMENT (HPI)
Pt reports having constipation for the past 3 days. Pt denies any rectal bleeding.  Pt states he went to his pmd and was told he has a fecal impaction and sent here for further evaluation

## 2020-04-13 NOTE — ED NOTES
Discharge instructions given ot pt and wife. Pt and wife verbalized understanding of home care, and to follow up with pcp. Pt and wife denied further questions or concerns. pt ambulatory out of ED, discharged in stable condition.

## 2020-04-13 NOTE — ED PROVIDER NOTES
Patient Seen in: [facility] AND [facility] Emergency Department    History   Patient presents with:  Constipation    Stated Complaint: constipation x 3 days    HPI    Patient is here with complaint of constipation.   He has history of recent diagnosis of stomach Performed by Dre Morocho MD at [facility] N/A 6/10/2016    Performed by Dre Morocho MD at [address] [place] [address]   • ENDOSCOPIC ULTRASOUND (EUS) N/A 1/29/2020    Performed by Michael Ahr Quit date: 1994        Years since quittin.2      Smokeless tobacco: Never Used      Tobacco comment: 2008 quit    Alcohol use: Not Currently      Frequency: 4 or more times a week      Comment: 4-5 drinks a day on average    Drug use: No        M SpO2 99%         Physical Exam  Constitutional:  Alert, well nourished adult lying in bed in no distress. Vital signs noted. Eye:  No scleral icterus. Eyelids appear normal, no lesions. Abdomen:  Soft, non-tender, non-distended.   No masses, no hepato-s

## 2020-05-04 PROBLEM — Z79.01 MONITORING FOR LONG-TERM ANTICOAGULANT USE: Status: RESOLVED | Noted: 2019-01-01 | Resolved: 2020-01-01

## 2020-05-04 PROBLEM — Z51.81 MONITORING FOR LONG-TERM ANTICOAGULANT USE: Status: RESOLVED | Noted: 2019-01-01 | Resolved: 2020-01-01

## 2020-05-15 PROBLEM — K56.600 PARTIAL INTESTINAL OBSTRUCTION, UNSPECIFIED CAUSE (HCC): Status: ACTIVE | Noted: 2020-01-01

## 2020-05-15 PROBLEM — K52.9 ILEITIS: Status: ACTIVE | Noted: 2020-01-01

## 2020-05-15 PROBLEM — N17.9 AKI (ACUTE KIDNEY INJURY) (HCC): Status: ACTIVE | Noted: 2020-01-01

## 2020-05-15 PROBLEM — R77.8 ELEVATED TROPONIN: Status: ACTIVE | Noted: 2020-01-01

## 2020-05-15 NOTE — PROGRESS NOTES
Vascular Access Note    Vascular Access Screening:   Allergies to Lidocaine: no  Allergies to Latex: no  Presence of Pacemaker/Defibrillator: No  Mastectomy with Lymph Node Dissection: No  AV Fistula / AV Graft: No  Dialysis Catheter: No  Central Line: Arturo

## 2020-05-15 NOTE — H&P
[facility] Hospitalist H&P       CC: Patient presents with:  Dyspnea AMBREEN SOB  Abdomen/Flank Pain       PCP: Abby Granado MD    History of Present Illness: 67 y/o m w hx of gastric adenoca w peritoneal mets, afib on warfarin, bladder CA, HTN, HL, HTN last d/reena ENDOSCOPY   • ESOPHAGOGASTRODUODENOSCOPY (EGD) N/A 10/1/2019    Performed by Edwina Stern MD at [place] [address] N/A 2/21/2020    Performed by Andre Summers MD at [address] [place] [address] Rfl: 3  Triamterene-HCTZ 37.5-25 MG Oral Cap, Take 1 capsule by mouth every morning., Disp: 30 capsule, Rfl: 1  apixaban 5 MG Oral Tab, Take 1 tablet (5 mg total) by mouth 2 (two) times daily.  Please let pt know co pay cost as well, Disp: 60 tablet, Rfl: 0 rate and rhythm, S1, S2 normal, no murmur, rub or gallop appreciated   Abdomen:   Soft, non-tender. Bowel sounds normal. No masses,  No organomegaly. Non distended   Extremities: Extremities normal, atraumatic, no cyanosis or edema.    Skin: Skin color, dot (CST): Belle Villalta MD on 5/15/2020 at 8:32 AM     Finalized by (CST): Belle Villalta MD on 5/15/2020 at 8:43 AM          Xr Chest Ap Portable  (cpt=71045)    Result Date: 5/15/2020  CONCLUSION:  1. Malpositioned nasogastric tube coiled back on w possible ileus: npo; hx of UC  -s/p NG tube    Diarrhea:cdiff neg 5/13, stool cx p    GI bleed 3/20: eliquis held    Afib: eliquis on hold, dilt drip held d/t low bp, given dig x 1 now  -echo 9/19 ef 50-55    Covid: rapid neg, confirmatory p  -ferritin/c

## 2020-05-15 NOTE — PROGRESS NOTES
RRT    *See RRT Documentation Record*    Reason the RRT was called: patient less responsive and O2 sat 64  Assessment of patient leading up to RRT: pt had an NG tube placed at bedside by surgeon with possible aspiration  Interventions/Testing: ABG's, CXR

## 2020-05-15 NOTE — ED INITIAL ASSESSMENT (HPI)
Pt brought in from home by EMS, c/o lower mid abdominal pain, pt has hx of stomach CA, last chemo 1.5 weeks ago. EMS found pt to be in afib/RVR, pt AOx4, denies CP, \"more SOB than usual.\" Pt states he has had diarrhea since getting chemo.

## 2020-05-15 NOTE — ED PROVIDER NOTES
Patient Seen in: [facility] Emergency Department    History   Patient presents with:  Dyspnea AMBREEN SOB  Abdomen/Flank Pain    Stated Complaint: abdominal/SOB/afib     HPI    77-year-old male with past medical history of atrial fibrillation with recen ENDOSCOPY   • CYSTOSCOPY  WITH BIOPSY N/A 7/14/2017    Performed by Manolo Brody MD at [address] TUMOR W/ MITOMYCIN N/A 3/14/2014    Performed by Manolo Brody MD at [facility] Scalp/ SCC   • TONSILLECTOMY         Medications :   Baclofen 5 MG Oral Tab,  Take 5 mg by mouth 3 (three) times daily.    CARTIA  MG Oral Capsule SR 24 Hr,  TAKE ONE CAPSULE BY MOUTH ONE TIME DAILY   diphenoxylate-atropine (LOMOTIL) 2.5-0.025 MG Oral History    Tobacco Use      Smoking status: Former Smoker        Packs/day: 1.00        Years: 40.00        Pack years: 40        Types: Cigarettes        Quit date: 1994        Years since quittin.3      Smokeless tobacco: Never Used      Tobacco Non- 19 (*)     GFR, -American 22 (*)     All other components within normal limits   TROPONIN I - Abnormal; Notable for the following components:    Troponin 0.093 (*)     All other components within normal limits   D-DIMER - Abnorm individual orders.    URINALYSIS WITH CULTURE REFLEX   TROPONIN I   TROPONIN I   FERRITIN   C-REACTIVE PROTEIN   RAINBOW DRAW BLUE   RAINBOW DRAW LAVENDER   RAINBOW DRAW LIGHT GREEN   RAINBOW DRAW GOLD   RAPID SARS-COV-2 BY PCR     EKG    Rate, intervals an abdomen distal ileum shows diffuse wall thickening. Findings suggest a distal ileitis and secondary small bowel obstruction. Air is seen in the large bowel without distention or wall thickening. No free air or significant free fluid.     Distal gastric w Phone    [id number] [phone]    NAME: Abram Ireland OF EXAM: 05/15/2020  Patient No:  [id number]  Physician:  Lindsey Bentley  YOB: 1940    Past Medical History (entered by Technologist):    Reason For Exam (entered by [employer] graciously admitted to coverage Dr. Brandt Wallace with surgery/cardiology Ann Marie Mayen/Luanne consulted and recs appreciated.     I was wearing at minimum a facemask and eye protection throughout this encounter with handwashing performed prior and after patient

## 2020-05-15 NOTE — RESPIRATORY THERAPY NOTE
Pt. Found on NRB mask and 15L HF NC combo. Saturation 99%. However Patient RR in the high 38 to 40. Patient placed on cont. BIPAP ( closed circuit with HEPA filter ). BIPAP settings rate 12  IPAP 12/ EPAP 5, FIO2 100%. Used a medium full face mask.  Jovana

## 2020-05-15 NOTE — PLAN OF CARE
This evening patient was admitted to the cardiac unit for new A.fib with RVR. Patient is currently on Cardizem gtt at 15mL/hr with HR fluctuating between 110s to 140s.  Plan is to control rates and determine if this new abdominal obstruction is related to h as ordered  - Evaluate effectiveness of ordered antiemetic medications  - Provide nonpharmacologic comfort measures as appropriate  - Advance diet as tolerated, if ordered  - Obtain nutritional consult as needed  - Evaluate fluid balance  Outcome: Progress INFECTION - ADULT  Goal: Absence of fever/infection during anticipated neutropenic period  Description  INTERVENTIONS  - Monitor WBC  - Administer growth factors as ordered  - Implement neutropenic guidelines  Outcome: Progressing     Problem: SAFETY ADULT bleeding, hypotension and signs of decreased cardiac output  - Evaluate effectiveness of vasoactive medications to optimize hemodynamic stability  - Monitor arterial and/or venous puncture sites for bleeding and/or hematoma  - Assess quality of pulses, ski

## 2020-05-15 NOTE — CONSULTS
Critical Care Consult     Assessment / Plan:  1. Acute respiratory failure - concern for aspiration event with underlying metabolic acidosis. Lung perfusion study is low prob for PE. Chest imaging with GGOs.  Rapid SARS-CoV-2 negative  - on NRB; needs intub • HYPERLIPIDEMIA    • Kidney disease     kidney stones   • OTHER DISEASES     ulc colitis   • OTHER DISEASES     rosacea   • OTHER DISEASES     carotid art ds   • Prediabetes    • Spontaneous pneumothorax 1960's    Bilaterally   • Ulcerative colitis ([facility] [address] - Dr Lynette Hurtado   • OTHER SURGICAL HISTORY  07/14/2017    cysto, bbx, bladder wash Dr Lynette Hurtado    • OTHER SURGICAL HISTORY  10/17/2017    BTS Cystoscopy- Dr. Lynette Hurtado   • OTHER SURGICAL HISTORY  02/20/2018    BTS Cystoscopy- Dr. Lynette Hurtado   • [address] [id number] Taking  Polyethylene Glycol 3350 (MIRALAX) Oral Powder, Take 17 g by mouth daily. , Disp: 507 g, Rfl: 2, More than a month at Unknown time  docusate sodium 100 MG Oral Cap, Take 100 mg by mouth daily as needed for constipation. , Disp: , Rfl: , More than a m Former Smoker        Packs/day: 1.00        Years: 40.00        Pack years: 40        Types: Cigarettes        Quit date: 1994        Years since quittin.3      Smokeless tobacco: Never Used      Tobacco comment: 2008 quit    Alcohol use: Not Curr

## 2020-05-15 NOTE — CONSULTS
[address] [place] [address] ABIGAIL [place]  DAC:[id number]  LOS:0    Date of Admission:  5/14/2020  Date of Consult:  5/15/2020 BLADDER TUMOR W/ MITOMYCIN N/A 3/14/2014    Performed by Thanh Mccarty MD at [facility] AND FULGURATION N/A 6/10/2016    Performed by Thanh Mccarty MD at [address] [facility] ([facility]       reports that he quit smoking about 26 years ago. His smoking use included cigarettes. He has a 40.00 pack-year smoking history. He has never used smokeless tobacco. He reports previous alcohol use.  He reports that he does not us is 15.83 kg/m².     General: no acute distress, uncomfortable and cachectic  Pulmonary:lungs clear to auscultation bilaterally anteriorly, port in place  Cardiac: tachycardic and irregularly irregular  Abdomen: soft, mildly distended and mild deep tendernes cannot be excluded. 3. Mild fluid-filled distention of the body and fundus. Diffuse thickening involving the distal gastric antrum may reflect patient's known gastric malignancy versus nonspecific gastritis.  4. Scattered ground-glass opacification at the Shantanu Moore MD on 5/15/2020 at 7:41 AM          Xr Chest Ap Portable  (cpt=71045)    Result Date: 5/14/2020  CONCLUSION:  1. No acute findings.     Dictated by (CST): Rosa Olivas MD on 5/14/2020 at 9:14 PM     Finalized by (CST): [facility]

## 2020-05-15 NOTE — CONSULTS
[facility]    PATIENT'S NAME: Ying Vogtsteve   ATTENDING PHYSICIAN: Jocelin Oliva MD   CONSULTING PHYSICIAN: Celsa Nicholson MD   PATIENT ACCOUNT#:   [id number]    LOCATION:  [address] [place] [address] #:   [id number]       DATE OF BIRTH: gastric adenocarcinoma. His appetite has been extremely poor. There is no known history of stroke.       PHYSICAL EXAMINATION:    GENERAL:  He is a male currently short of breath at rest.   VITAL SIGNS:  Temperature 97.3 degrees Fahrenheit, heart rate 118 Not acute myocardial infarction. PLAN:    1. SARS by PCR. 2.   Continue anticoagulation for his atrial fibrillation. Note that he was recently changed from warfarin to Eliquis due to recently elevated INR felt secondary to his chemotherapy.   3.   Nu

## 2020-05-15 NOTE — PROGRESS NOTES
[address] [facility] Dosing Service    Initial Pharmacokinetic Consult for Vancomycin Dosing     Pranav Lutz. is a 68year old male who is being treated for pneumonia. Pharmacy has been asked to dose Vancomycin by Dr. Severino Pert    He has No Known Allergies.

## 2020-05-15 NOTE — PROGRESS NOTES
[facility] Pharmacy Note:  Renal Adjustment for piperacillin/tazobactam (Adriana Russo)    Danish Lomas. is a 68year old male who has been prescribed piperacillin/tazobactam (ZOSYN) 3375 mg every 8 hrs.   CrCl is estimated creatinine clearance is 18.1 mL/min (A) (ba

## 2020-05-15 NOTE — PROGRESS NOTES
Patient started on bipap for increased work of breathing. More hypotensive now and is requiring norepi, phenylephrine and starting vaso. Multiple IVF boluses given. Stress dose steroids given. Called wife with an update. She confirmed DNR/I status.  She soledad

## 2020-05-15 NOTE — PROGRESS NOTES
[facility] Pharmacy Note:  Renal Dose Adjustment for Metoclopramide (REGLAN)    Josey Gordillo has been prescribed Metoclopramide (REGLAN) 10 mg every 8 hours as needed for n/v.    Estimated Creatinine Clearance: 18.1 mL/min (A) (based on SCr of 2.59 mg/dL (H

## 2020-05-16 NOTE — DISCHARGE SUMMARY
General Medicine Discharge Summary     Patient ID:  Nneka Less.  68year old  5/14/1940      Expiration NOTE    Admit date: 5/14/2020    Discharge date and time: 5/15/2020  6:42 PM     Attending Physician: No att. providers found     Consults: None CONCLUSION:  1. Generalized fluid-filled small bowel loops involving the jejunum and ileum with mild fusiform thickening involving the distal/terminal ileum suggesting regional enteritis versus nonspecific infectious etiologies.   2. Superimposed distal ile CONCLUSION:  1. Nasogastric tube tip in gastric fundus. 2. Bibasilar pneumonia. 3. Right lateral pleural thickening and or small pleural effusion. 4. COPD/emphysema. 5. Multiple dilated small bowel loop seen at the edge of the field of view.     Dictated by Commonly known as:  LIPITOR  TAKE 1  TABLET ONCE DAILY     Baclofen 5 MG Tabs  Take 5 mg by mouth 3 (three) times daily.      Cartia  MG Cp24  Generic drug:  dilTIAZem HCl ER Coated Beads  TAKE ONE CAPSULE BY MOUTH ONE TIME DAILY     dexamethasone 4 M Pager

## 2020-05-16 NOTE — PROGRESS NOTES
's office called, and spoke with Meseret Gasca. Corner released the body to the [facility].

## 2020-05-18 NOTE — PAYOR COMM NOTE
--------------  Appropriate for inpatient status per guidelines for abdominal pain with hx of gastric cancer now with respiratory failure.  5/15.         ADMISSION REVIEW     Payor: [address] [place] [address] #:  [id number]  Authorizatio rosacea   • OTHER DISEASES     carotid art ds   • Prediabetes    • Spontaneous pneumothorax 1960's    Bilaterally   • Ulcerative colitis ([facility] [address] 4/1/2015       Past Surgical History:   Procedure Laterality Date   • COLONOSCOPY     • COLONOSCOPY N/A 10/1/201 • OTHER SURGICAL HISTORY  10/17/2017    BTS Cystoscopy- Dr. Gonzales Book   • OTHER SURGICAL HISTORY  02/20/2018    BTS Cystoscopy- Dr. Gonzales Book   • OTHER SURGICAL HISTORY  08/28/2018    BTS Cystoscopy- Dr. Gonzales Book   • OTHER SURGICAL HISTORY  09/17/2019    BTS Cy GFR, -[facility] (*)     All other components within normal limits   TROPONIN I - Abnormal; Notable for the following components:    Troponin 0.093 (*)     All other components within normal limits   D-DIMER - Abnormal; Notable for the following c URINALYSIS WITH CULTURE REFLEX   TROPONIN I   TROPONIN I   FERRITIN   C-REACTIVE PROTEIN   RAINBOW DRAW BLUE   RAINBOW DRAW LAVENDER   RAINBOW DRAW LIGHT GREEN   RAINBOW DRAW GOLD   RAPID SARS-COV-2 BY PCR     EKG    Rate, intervals and axes as noted on EK Kidneys are symmetric in size and contour. There are bilateral renal cysts. No perinephric fluid, hydronephrosis or calcification within collecting system. There are a few scattered groundglass opacities in the lower lobes.   Findings raise concern for AMIRA (acute kidney injury) ([facility] [address]  Elevated troponin  Ileitis  Partial intestinal obstruction, unspecified cause ([facility]    Disposition:  Admit               H&P - H&P Note          [facility] Hospitalist H&P       CC: Patient presents with:  Dyspnea AMBREEN SOB  Abdomen • ENDOSCOPIC ULTRASOUND (EUS) N/A 1/29/2020    Performed by Ena Nj MD at [facility] ENDOSCOPY   • ESOPHAGOGASTRODUODENOSCOPY (EGD) N/A 1/29/2020    Performed by Ena Nj MD at [facility] ENDOSCOPY   • ESOPHAGOGASTRODUODENOSCOPY (EGD) N/A 10/1/2019    P Head:  Normocephalic, without obvious abnormality, atraumatic. Eyes:  Sclera anicteric, No conjunctival pallor, EOMs intact. Nose: Nares normal. Septum midline. Mucosa normal. No drainage.    Throat: Lips, mucosa, and tongue normal. Teeth and gums norm -cxr p/abg p  -v/q neg    Sepsis: d/t HAP/aspiration  -bp improving w fluids, levophed prn        sbo w ileitis w possible ileus: npo; hx of UC  -s/p NG tube    Diarrhea:cdiff neg 5/13, stool cx p    GI bleed 3/20: eliquis held    Afib: eliquis on hold, di Obtain repeat COVID test to r/o coronavirus PNA  PCU monitoring     The patient is not a surgery candidate at this time. We will treat conservatively as stated above.   He may be a candidate for palliative/hospice care if he clinically deteriorates Hospital Course:   69 y/o m w hx of gastric adenoca w peritoneal mets, afib on warfarin, bladder CA, HTN, HL, HTN last d/reena 3/26 for melena on eliquis w coumadin stopped now p/w diarrhea found to have sbo s/p ng tube and afib w rvr on dilt drip, pt became CONCLUSION: Interval placement of left PICC line with the tip in the superior vena cava. No evidence of pneumothorax. No other significant interval change including patchy bilateral lower lung opacities and small right pleural effusion.     Dictated by (C CONCLUSION:  1. No acute findings.     Dictated by (CST): Adolfo De La Garza MD on 5/14/2020 at 9:14 PM     Finalized by (CST): Adolfo De La Garza MD on 5/14/2020 at 9:15 PM                    Home Medication Changes:   Med list      Medication Delene Shaker Take 1 capsule by mouth every morning.           * This list has 3 medication(s) that are the same as other medications prescribed for you.  Read the directions carefully, and ask your doctor or other care provider to review them with you.

## 2022-12-23 NOTE — PLAN OF CARE
Problem: Patient Centered Care  Goal: Patient preferences are identified and integrated in the patient's plan of care  Description  Interventions:  - What would you like us to know as we care for you?   - Provide timely, complete, and accurate informatio Monitor WBC  - Administer growth factors as ordered  - Implement neutropenic guidelines  Outcome: Completed     Problem: SAFETY ADULT - FALL  Goal: Free from fall injury  Description  INTERVENTIONS:  - Assess pt frequently for physical needs  - Identify co hemodynamic stability  - Monitor arterial and/or venous puncture sites for bleeding and/or hematoma  - Assess quality of pulses, skin color and temperature  - Assess for signs of decreased coronary artery perfusion - ex.  Angina  - Evaluate fluid balance, a medication profile  Outcome: Completed     Problem: METABOLIC/FLUID AND ELECTROLYTES - ADULT  Goal: Electrolytes maintained within normal limits  Description  INTERVENTIONS:  - Monitor labs and rhythm and assess patient for signs and symptoms of electrolyt Yes

## (undated) DEVICE — FORCEP RADIAL JAW 4

## (undated) DEVICE — MEDI-VAC NON-CONDUCTIVE SUCTION TUBING 6MM X 1.8M (6FT.) L: Brand: CARDINAL HEALTH

## (undated) DEVICE — LINE MNTR ADLT SET O2 INTMD

## (undated) DEVICE — Device: Brand: DEFENDO AIR/WATER/SUCTION AND BIOPSY VALVE

## (undated) DEVICE — CONMED SCOPE SAVER BITE BLOCK, 20X27 MM: Brand: SCOPE SAVER

## (undated) DEVICE — Device: Brand: CUSTOM PROCEDURE KIT

## (undated) DEVICE — SNARE ENDOSCOPIC 10MM ROUND

## (undated) DEVICE — 3 ML SYRINGE LUER-LOCK TIP: Brand: MONOJECT

## (undated) DEVICE — ENDOSCOPIC ULTRASOUND FINE NEEDLE BIOPSY (FNB) DEVICE: Brand: ACQUIRE

## (undated) DEVICE — 6 ML SYRINGE LUER-LOCK TIP: Brand: MONOJECT

## (undated) DEVICE — CANNULA NASAL 02/C02 ADULT

## (undated) DEVICE — SNARE OPTMZ PLPCTM TRP

## (undated) NOTE — LETTER
[address] [facility]     I agree to have a Peripherally Inserted Central Catheter (PICC) placed in my arm.    1. The PICC insertion procedure, care, maintenance, risks, benefits, and complications have been explained to me b also discussed reasonable alternatives to the PICC, including risks, benefits, and side effects related to the alternatives and risks related to not receiving this procedure.     8.  I have expressed any questions about this procedure to my physician or the

## (undated) NOTE — LETTER
[address]  [facility] [address], [place], [place]  [address]  INFORMED CONSENT FOR TRANSFUSION OF BLOOD OR BLOOD PRODUCTS  My physician has informed me of the nature, purpose, benefits and risks of transfusion for blood and blood components that ______________________________________________  (Signature of Patient)                                                            (Responsible party in case of Minor,